# Patient Record
Sex: FEMALE | Race: WHITE | Employment: FULL TIME | URBAN - METROPOLITAN AREA
[De-identification: names, ages, dates, MRNs, and addresses within clinical notes are randomized per-mention and may not be internally consistent; named-entity substitution may affect disease eponyms.]

---

## 2018-04-27 DIAGNOSIS — Z86.2 PERSONAL HISTORY OF DISEASES OF BLOOD AND BLOOD-FORMING ORGANS: ICD-10-CM

## 2018-04-27 DIAGNOSIS — Z52.001 STEM CELL DONOR: ICD-10-CM

## 2018-04-30 ENCOUNTER — RESULTS ONLY (OUTPATIENT)
Dept: OTHER | Facility: CLINIC | Age: 58
End: 2018-04-30

## 2018-04-30 ENCOUNTER — OFFICE VISIT (OUTPATIENT)
Dept: TRANSPLANT | Facility: CLINIC | Age: 58
End: 2018-04-30
Attending: INTERNAL MEDICINE

## 2018-04-30 ENCOUNTER — OFFICE VISIT (OUTPATIENT)
Dept: TRANSPLANT | Facility: CLINIC | Age: 58
End: 2018-04-30
Attending: NURSE PRACTITIONER

## 2018-04-30 ENCOUNTER — RADIANT APPOINTMENT (OUTPATIENT)
Dept: GENERAL RADIOLOGY | Facility: CLINIC | Age: 58
End: 2018-04-30
Attending: INTERNAL MEDICINE

## 2018-04-30 VITALS
OXYGEN SATURATION: 98 % | RESPIRATION RATE: 16 BRPM | HEIGHT: 63 IN | HEART RATE: 88 BPM | WEIGHT: 184.1 LBS | SYSTOLIC BLOOD PRESSURE: 139 MMHG | TEMPERATURE: 97.8 F | BODY MASS INDEX: 32.62 KG/M2 | DIASTOLIC BLOOD PRESSURE: 86 MMHG

## 2018-04-30 VITALS
RESPIRATION RATE: 16 BRPM | SYSTOLIC BLOOD PRESSURE: 139 MMHG | BODY MASS INDEX: 32.62 KG/M2 | WEIGHT: 184.1 LBS | DIASTOLIC BLOOD PRESSURE: 86 MMHG | OXYGEN SATURATION: 98 % | HEART RATE: 88 BPM | HEIGHT: 63 IN | TEMPERATURE: 97.8 F

## 2018-04-30 DIAGNOSIS — Z86.2 PERSONAL HISTORY OF DISEASES OF BLOOD AND BLOOD-FORMING ORGANS: ICD-10-CM

## 2018-04-30 DIAGNOSIS — Z52.001 STEM CELL DONOR: ICD-10-CM

## 2018-04-30 DIAGNOSIS — Z52.3 BONE MARROW DONOR: Primary | ICD-10-CM

## 2018-04-30 LAB
ABO + RH BLD: NORMAL
ABO + RH BLD: NORMAL
ALBUMIN SERPL-MCNC: 4 G/DL (ref 3.4–5)
ALBUMIN UR-MCNC: NEGATIVE MG/DL
ALP SERPL-CCNC: 88 U/L (ref 40–150)
ALT SERPL W P-5'-P-CCNC: 24 U/L (ref 0–50)
ANION GAP SERPL CALCULATED.3IONS-SCNC: 9 MMOL/L (ref 3–14)
APPEARANCE UR: CLEAR
APTT PPP: 27 SEC (ref 22–37)
AST SERPL W P-5'-P-CCNC: 14 U/L (ref 0–45)
BASOPHILS # BLD AUTO: 0.1 10E9/L (ref 0–0.2)
BASOPHILS NFR BLD AUTO: 0.9 %
BILIRUB SERPL-MCNC: 0.3 MG/DL (ref 0.2–1.3)
BILIRUB UR QL STRIP: NEGATIVE
BLD GP AB SCN SERPL QL: NORMAL
BLOOD BANK CMNT PATIENT-IMP: NORMAL
BUN SERPL-MCNC: 14 MG/DL (ref 7–30)
CALCIUM SERPL-MCNC: 9.3 MG/DL (ref 8.5–10.1)
CHLORIDE SERPL-SCNC: 109 MMOL/L (ref 94–109)
CMV IGG SERPL QL IA: 7.6 AI (ref 0–0.8)
CO2 SERPL-SCNC: 23 MMOL/L (ref 20–32)
COLOR UR AUTO: YELLOW
CREAT SERPL-MCNC: 0.66 MG/DL (ref 0.52–1.04)
DIFFERENTIAL METHOD BLD: NORMAL
EBV VCA IGG SER QL IA: 6.1 AI (ref 0–0.8)
EOSINOPHIL # BLD AUTO: 0.3 10E9/L (ref 0–0.7)
EOSINOPHIL NFR BLD AUTO: 3.3 %
ERYTHROCYTE [DISTWIDTH] IN BLOOD BY AUTOMATED COUNT: 13.1 % (ref 10–15)
GFR SERPL CREATININE-BSD FRML MDRD: >90 ML/MIN/1.7M2
GLUCOSE SERPL-MCNC: 86 MG/DL (ref 70–99)
GLUCOSE UR STRIP-MCNC: NEGATIVE MG/DL
HCG SERPL QL: NEGATIVE
HCT VFR BLD AUTO: 45.1 % (ref 35–47)
HGB BLD-MCNC: 15.2 G/DL (ref 11.7–15.7)
HGB UR QL STRIP: NEGATIVE
HSV1 IGG SERPL QL IA: >8 AI (ref 0–0.8)
HSV2 IGG SERPL QL IA: <0.2 AI (ref 0–0.8)
IMM GRANULOCYTES # BLD: 0 10E9/L (ref 0–0.4)
IMM GRANULOCYTES NFR BLD: 0.3 %
INR PPP: 0.95 (ref 0.86–1.14)
KETONES UR STRIP-MCNC: NEGATIVE MG/DL
LEUKOCYTE ESTERASE UR QL STRIP: NEGATIVE
LYMPHOCYTES # BLD AUTO: 2.8 10E9/L (ref 0.8–5.3)
LYMPHOCYTES NFR BLD AUTO: 34.6 %
MCH RBC QN AUTO: 30.6 PG (ref 26.5–33)
MCHC RBC AUTO-ENTMCNC: 33.7 G/DL (ref 31.5–36.5)
MCV RBC AUTO: 91 FL (ref 78–100)
MONOCYTES # BLD AUTO: 0.6 10E9/L (ref 0–1.3)
MONOCYTES NFR BLD AUTO: 7.9 %
MUCOUS THREADS #/AREA URNS LPF: PRESENT /LPF
NEUTROPHILS # BLD AUTO: 4.2 10E9/L (ref 1.6–8.3)
NEUTROPHILS NFR BLD AUTO: 53 %
NITRATE UR QL: NEGATIVE
NRBC # BLD AUTO: 0 10*3/UL
NRBC BLD AUTO-RTO: 0 /100
PH UR STRIP: 6 PH (ref 5–7)
PLATELET # BLD AUTO: 234 10E9/L (ref 150–450)
POTASSIUM SERPL-SCNC: 4 MMOL/L (ref 3.4–5.3)
PROT SERPL-MCNC: 7.6 G/DL (ref 6.8–8.8)
RBC # BLD AUTO: 4.97 10E12/L (ref 3.8–5.2)
RBC #/AREA URNS AUTO: 0 /HPF (ref 0–2)
SODIUM SERPL-SCNC: 142 MMOL/L (ref 133–144)
SOURCE: ABNORMAL
SP GR UR STRIP: 1.01 (ref 1–1.03)
SPECIMEN EXP DATE BLD: NORMAL
SQUAMOUS #/AREA URNS AUTO: <1 /HPF (ref 0–1)
UROBILINOGEN UR STRIP-MCNC: 0 MG/DL (ref 0–2)
WBC # BLD AUTO: 8 10E9/L (ref 4–11)
WBC #/AREA URNS AUTO: 0 /HPF (ref 0–5)

## 2018-04-30 PROCEDURE — 86644 CMV ANTIBODY: CPT | Performed by: INTERNAL MEDICINE

## 2018-04-30 PROCEDURE — 86687 HTLV-I ANTIBODY: CPT | Performed by: INTERNAL MEDICINE

## 2018-04-30 PROCEDURE — 87535 HIV-1 PROBE&REVERSE TRNSCRPJ: CPT | Performed by: INTERNAL MEDICINE

## 2018-04-30 PROCEDURE — 86703 HIV-1/HIV-2 1 RESULT ANTBDY: CPT | Performed by: INTERNAL MEDICINE

## 2018-04-30 PROCEDURE — 86665 EPSTEIN-BARR CAPSID VCA: CPT | Performed by: INTERNAL MEDICINE

## 2018-04-30 PROCEDURE — 86753 PROTOZOA ANTIBODY NOS: CPT | Performed by: INTERNAL MEDICINE

## 2018-04-30 PROCEDURE — 86901 BLOOD TYPING SEROLOGIC RH(D): CPT | Performed by: INTERNAL MEDICINE

## 2018-04-30 PROCEDURE — 80053 COMPREHEN METABOLIC PANEL: CPT | Performed by: INTERNAL MEDICINE

## 2018-04-30 PROCEDURE — 85610 PROTHROMBIN TIME: CPT | Performed by: INTERNAL MEDICINE

## 2018-04-30 PROCEDURE — 85730 THROMBOPLASTIN TIME PARTIAL: CPT | Performed by: INTERNAL MEDICINE

## 2018-04-30 PROCEDURE — 84703 CHORIONIC GONADOTROPIN ASSAY: CPT | Performed by: INTERNAL MEDICINE

## 2018-04-30 PROCEDURE — 40000268 ZZH STATISTIC NO CHARGES: Mod: ZF

## 2018-04-30 PROCEDURE — 86695 HERPES SIMPLEX TYPE 1 TEST: CPT | Performed by: INTERNAL MEDICINE

## 2018-04-30 PROCEDURE — 85025 COMPLETE CBC W/AUTO DIFF WBC: CPT | Performed by: INTERNAL MEDICINE

## 2018-04-30 PROCEDURE — 86900 BLOOD TYPING SEROLOGIC ABO: CPT | Performed by: INTERNAL MEDICINE

## 2018-04-30 PROCEDURE — 87516 HEPATITIS B DNA AMP PROBE: CPT | Performed by: INTERNAL MEDICINE

## 2018-04-30 PROCEDURE — 40000803 ZZHCL STATISTIC DNA ISOL HIGH PURITY: Performed by: INTERNAL MEDICINE

## 2018-04-30 PROCEDURE — 86696 HERPES SIMPLEX TYPE 2 TEST: CPT | Performed by: INTERNAL MEDICINE

## 2018-04-30 PROCEDURE — 87521 HEPATITIS C PROBE&RVRS TRNSC: CPT | Performed by: INTERNAL MEDICINE

## 2018-04-30 PROCEDURE — 86803 HEPATITIS C AB TEST: CPT | Performed by: INTERNAL MEDICINE

## 2018-04-30 PROCEDURE — 93010 ELECTROCARDIOGRAM REPORT: CPT | Mod: ZP | Performed by: INTERNAL MEDICINE

## 2018-04-30 PROCEDURE — 86850 RBC ANTIBODY SCREEN: CPT | Performed by: INTERNAL MEDICINE

## 2018-04-30 PROCEDURE — 81001 URINALYSIS AUTO W/SCOPE: CPT | Performed by: INTERNAL MEDICINE

## 2018-04-30 PROCEDURE — 87340 HEPATITIS B SURFACE AG IA: CPT | Performed by: INTERNAL MEDICINE

## 2018-04-30 PROCEDURE — 86780 TREPONEMA PALLIDUM: CPT | Performed by: INTERNAL MEDICINE

## 2018-04-30 PROCEDURE — 86704 HEP B CORE ANTIBODY TOTAL: CPT | Performed by: INTERNAL MEDICINE

## 2018-04-30 PROCEDURE — 87798 DETECT AGENT NOS DNA AMP: CPT | Performed by: INTERNAL MEDICINE

## 2018-04-30 RX ORDER — NAPROXEN SODIUM 550 MG/1
550 TABLET ORAL PRN
Status: ON HOLD | COMMUNITY
End: 2018-06-28

## 2018-04-30 RX ORDER — SUMATRIPTAN 100 MG/1
100 TABLET, FILM COATED ORAL PRN
COMMUNITY

## 2018-04-30 RX ORDER — IBUPROFEN 800 MG/1
800 TABLET, FILM COATED ORAL PRN
Status: ON HOLD | COMMUNITY
End: 2018-06-28

## 2018-04-30 RX ORDER — CYCLOBENZAPRINE HCL 10 MG
10 TABLET ORAL PRN
COMMUNITY
Start: 2017-10-17

## 2018-04-30 RX ORDER — MULTIPLE VITAMINS W/ MINERALS TAB 9MG-400MCG
1 TAB ORAL DAILY
COMMUNITY

## 2018-04-30 ASSESSMENT — PAIN SCALES - GENERAL
PAINLEVEL: NO PAIN (0)
PAINLEVEL: NO PAIN (0)

## 2018-04-30 NOTE — MR AVS SNAPSHOT
"              After Visit Summary   4/30/2018    Xiao Rebollar    MRN: [id number]           Patient Information     Date Of Birth          1960        Visit Information        Provider Department      4/30/2018 1:30 PM  BMT RODRÍGUEZ #3 [facility] Blood and Marrow Transplant        Today's Diagnoses     Bone marrow donor    -  1          [facility] ([facility])  [address] [place] [address]  Phone: [phone]  Clinic Hours:   Monday-Thursday:7am to 7pm   Friday: 7am to 5pm   Weekends and holidays:    8am to noon (in general)  If your fever is 100.5  or greater,   call the clinic.  After hours call the   hospital at [phone] or   [phone]. Ask for the BMT   fellow on-call            Follow-ups after your visit        Who to contact     If you have questions or need follow up information about today's clinic visit or your schedule please contact [facility] BLOOD AND MARROW TRANSPLANT directly at [phone].  Normal or non-critical lab and imaging results will be communicated to you by [employer] Systemshart, letter or phone within 4 business days after the clinic has received the results. If you do not hear from us within 7 days, please contact the clinic through Pir[employer] or phone. If you have a critical or abnormal lab result, we will notify you by phone as soon as possible.  Submit refill requests through [employer] or call your pharmacy and they will forward the refill request to us. Please allow 3 business days for your refill to be completed.          Additional Information About Your Visit        [employer] Information     [employer] lets you send messages to your doctor, view your test results, renew your prescriptions, schedule appointments and more. To sign up, go to [email or web address] . Click on \"Log in\" on the left side of the screen, which will take you to the Welcome page. Then click on \"Sign up Now\" on the right side of the page.     You will be asked to enter the access code listed " "below, as well as some personal information. Please follow the directions to create your username and password.     Your access code is: [email or web address]  Expires: 2018  6:30 AM     Your access code will  in 90 days. If you need help or a new code, please call your [place] clinic or [phone].        Care EveryWhere ID     This is your Care EveryWhere ID. This could be used by other organizations to access your [place] medical records  [id number]        Your Vitals Were     Pulse Temperature Respirations Height Pulse Oximetry BMI (Body Mass Index)    88 97.8  F (36.6  C) (Oral) 16 1.595 m (5' 2.8\") 98% 32.83 kg/m2       Blood Pressure from Last 3 Encounters:   18 139/86   18 139/86    Weight from Last 3 Encounters:   18 83.5 kg (184 lb 1.6 oz)   18 83.5 kg (184 lb 1.6 oz)              Today, you had the following     No orders found for display       Recent Review Flowsheet Data     BMT Recent Results Latest Ref Rng & Units 2018    WBC 4.0 - 11.0 10e9/L 8.0    Hemoglobin 11.7 - 15.7 g/dL 15.2    Platelet Count 150 - 450 10e9/L 234    Neutrophils (Absolute) 1.6 - 8.3 10e9/L 4.2    INR 0.86 - 1.14 0.95    Sodium 133 - 144 mmol/L 142    Potassium 3.4 - 5.3 mmol/L 4.0    Chloride 94 - 109 mmol/L 109    Glucose 70 - 99 mg/dL 86    Urea Nitrogen 7 - 30 mg/dL 14    Creatinine 0.52 - 1.04 mg/dL 0.66    Calcium (Total) 8.5 - 10.1 mg/dL 9.3    Protein (Total) 6.8 - 8.8 g/dL 7.6    Albumin 3.4 - 5.0 g/dL 4.0    Alkaline Phosphatase 40 - 150 U/L 88    AST 0 - 45 U/L 14    ALT 0 - 50 U/L 24    MCV 78 - 100 fl 91               Primary Care Provider Fax #    Physician No Ref-Primary [phone]       No address on file        Equal Access to Services     [facility]: Natalia Lange, dallas arroyo, joya yu, linda ortiz. [facility] [phone].    ATENCIÓN: Si habla español, tiene a gregory disposición servicios gratuitos de " asistencia lingüística. Sandra al [phone].    We comply with applicable federal civil rights laws and [place] laws. We do not discriminate on the basis of race, color, national origin, age, disability, sex, sexual orientation, or gender identity.            Thank you!     Thank you for choosing [facility] BLOOD AND MARROW TRANSPLANT  for your care. Our goal is always to provide you with excellent care. Hearing back from our patients is one way we can continue to improve our services. Please take a few minutes to complete the written survey that you may receive in the mail after your visit with us. Thank you!             Your Updated Medication List - Protect others around you: Learn how to safely use, store and throw away your medicines at www.disposemymeds.org.          This list is accurate as of 4/30/18  3:35 PM.  Always use your most recent med list.                   Brand Name Dispense Instructions for use Diagnosis    cyclobenzaprine 10 MG tablet    FLEXERIL     Take 10 mg by mouth as needed        ibuprofen 800 MG tablet    ADVIL/MOTRIN     Take 800 mg by mouth as needed        IMITREX 100 MG tablet   Generic drug:  SUMAtriptan      Take 100 mg by mouth as needed        magnesium sulfate 500 mg/mL Soln      Take by mouth as needed        Multi-vitamin Tabs tablet      Take 1 tablet by mouth daily        naproxen sodium 550 MG tablet    ANAPROX     Take 550 mg by mouth as needed

## 2018-04-30 NOTE — NURSING NOTE
Attempted to complete EKG, unable to perform, pt has spinal stimulator. Amy and Jenanie Vila (nurse coordinator) notified.  Carline Gar MA

## 2018-04-30 NOTE — MR AVS SNAPSHOT
"              After Visit Summary   4/30/2018    Xiao Rebollar    MRN: [id number]           Patient Information     Date Of Birth          1960        Visit Information        Provider Department      4/30/2018 1:00 PM Coordinator, James Bmt Nurse;  2 116 CONSULT RM [facility] Blood and Marrow Transplant        Today's Diagnoses     Bone marrow donor    -  1          Cl[facility]ics and Surgery Center ([facility])  [address] [place] [address]  Phone: [phone]  Clinic Hours:   Monday-Thursday:7am to 7pm   Friday: 7am to 5pm   Weekends and holidays:    8am to noon (in general)  If your fever is 100.5  or greater,   call the clinic.  After hours call the   hospital at [phone] or   [phone]. Ask for the BMT   fellow on-call            Follow-ups after your visit        Who to contact     If you have questions or need follow up information about today's clinic visit or your schedule please contact [facility] BLOOD AND MARROW TRANSPLANT directly at [phone].  Normal or non-critical lab and imaging results will be communicated to you by Rally[employer], letter or phone within 4 business days after the clinic has received the results. If you do not hear from us within 7 days, please contact the clinic through [employer] or phone. If you have a critical or abnormal lab result, we will notify you by phone as soon as possible.  Submit refill requests through [email or web address] or call your pharmacy and they will forward the refill request to us. Please allow 3 business days for your refill to be completed.          Additional Information About Your Visit        [employer] Information     [email or web address] lets you send messages to your doctor, view your test results, renew your prescriptions, schedule appointments and more. To sign up, go to [email or web address] . Click on \"Log in\" on the left side of the screen, which will take you to the Welcome page. Then click on \"Sign up Now\" on the right side of the page.     You will be asked " to enter the access code listed below, as well as some personal information. Please follow the directions to create your username and password.     Your access code is: [email or web address]  Expires: 2018  6:30 AM     Your access code will  in 90 days. If you need help or a new code, please call your [place] clinic or [phone].        Care EveryWhere ID     This is your Care EveryWhere ID. This could be used by other organizations to access your [place] medical records  [id number]         Blood Pressure from Last 3 Encounters:   18 139/86   18 139/86    Weight from Last 3 Encounters:   18 83.5 kg (184 lb 1.6 oz)   18 83.5 kg (184 lb 1.6 oz)              Today, you had the following     No orders found for display       Recent Review Flowsheet Data     BMT Recent Results Latest Ref Rng & Units 2018    WBC 4.0 - 11.0 10e9/L 8.0    Hemoglobin 11.7 - 15.7 g/dL 15.2    Platelet Count 150 - 450 10e9/L 234    Neutrophils (Absolute) 1.6 - 8.3 10e9/L 4.2    INR 0.86 - 1.14 0.95    Sodium 133 - 144 mmol/L 142    Potassium 3.4 - 5.3 mmol/L 4.0    Chloride 94 - 109 mmol/L 109    Glucose 70 - 99 mg/dL 86    Urea Nitrogen 7 - 30 mg/dL 14    Creatinine 0.52 - 1.04 mg/dL 0.66    Calcium (Total) 8.5 - 10.1 mg/dL 9.3    Protein (Total) 6.8 - 8.8 g/dL 7.6    Albumin 3.4 - 5.0 g/dL 4.0    Alkaline Phosphatase 40 - 150 U/L 88    AST 0 - 45 U/L 14    ALT 0 - 50 U/L 24    MCV 78 - 100 fl 91               Primary Care Provider Fax #    Physician No Ref-Primary [phone]       No address on file        Equal Access to Services     [employer] [facility] : Natalia Lange, dallas arroyo, linda jackson. So [facility] [phone].    ATENCIÓN: Si habla español, tiene a gregory disposición servicios gratuitos de asistencia lingüística. Llame al [phone].    We comply with applicable federal civil rights laws and [place] laws. We do not  discriminate on the basis of race, color, national origin, age, disability, sex, sexual orientation, or gender identity.            Thank you!     Thank you for choosing [facility] BLOOD AND MARROW TRANSPLANT  for your care. Our goal is always to provide you with excellent care. Hearing back from our patients is one way we can continue to improve our services. Please take a few minutes to complete the written survey that you may receive in the mail after your visit with us. Thank you!             Your Updated Medication List - Protect others around you: Learn how to safely use, store and throw away your medicines at www.disposemymeds.org.          This list is accurate as of 4/30/18 11:59 PM.  Always use your most recent med list.                   Brand Name Dispense Instructions for use Diagnosis    cyclobenzaprine 10 MG tablet    FLEXERIL     Take 10 mg by mouth as needed        ibuprofen 800 MG tablet    ADVIL/MOTRIN     Take 800 mg by mouth as needed        IMITREX 100 MG tablet   Generic drug:  SUMAtriptan      Take 100 mg by mouth as needed        magnesium sulfate 500 mg/mL Soln      Take by mouth as needed        Multi-vitamin Tabs tablet      Take 1 tablet by mouth daily        naproxen sodium 550 MG tablet    ANAPROX     Take 550 mg by mouth as needed

## 2018-04-30 NOTE — MR AVS SNAPSHOT
After Visit Summary   4/30/2018    Xiao Rebollar    MRN: [id number]           Patient Information     Date Of Birth          1960        Visit Information        Provider Department      4/30/2018 11:00 AM 1,  Bmt Nurse [facility] Blood and Marrow Transplant        Today's Diagnoses     Stem cell donor        Personal history of diseases of blood and blood-forming organs              [facility] [place] ([facility]  [address] [place] [address]  Phone: [phone]  Clinic Hours:   Monday-Thursday:7am to 7pm   Friday: 7am to 5pm   Weekends and holidays:    8am to noon (in general)  If your fever is 100.5  or greater,   call the clinic.  After hours call the   hospital at [phone] or   [phone]. Ask for the BMT   fellow on-call            Follow-ups after your visit        Your next 10 appointments already scheduled     Apr 30, 2018  1:00 PM CDT   BMT NURSE COORD WITH ROOM with  Bmt Nurse Coordinator,  2 116 CONSULT RM   [facility] Blood and Marrow Transplant ([facility])    [address] [place] [address] [phone]            Apr 30, 2018  1:30 PM CDT   Related Donor Visit with  BMT RODRÍGUEZ #3   [facility] Blood and Marrow Transplant ([facility])    [address] [place] [address] [phone]              Who to contact     If you have questions or need follow up information about today's clinic visit or your schedule please contact [facility] BLOOD AND MARROW TRANSPLANT directly at [phone].  Normal or non-critical lab and imaging results will be communicated to you by MyChart, letter or phone within 4 business days after the clinic has received the results. If you do not hear from us within 7 days, please contact the clinic through MyChart or phone. If you have a critical or abnormal lab result, we will notify you by phone as soon as possible.  Submit  "refill requests through [employer] or call your pharmacy and they will forward the refill request to us. Please allow 3 business days for your refill to be completed.          Additional Information About Your Visit        [email or web address] Information     AutoAlert lets you send messages to your doctor, view your test results, renew your prescriptions, schedule appointments and more. To sign up, go to [email or web address] . Click on \"Log in\" on the left side of the screen, which will take you to the Welcome page. Then click on \"Sign up Now\" on the right side of the page.     You will be asked to enter the access code listed below, as well as some personal information. Please follow the directions to create your username and password.     Your access code is: [email or web address]  Expires: 2018  6:30 AM     Your access code will  in 90 days. If you need help or a new code, please call your [place] clinic or [phone].        Care EveryWhere ID     This is your Care EveryWhere ID. This could be used by other organizations to access your [place] medical records  [id number]        Your Vitals Were     Pulse Temperature Respirations Height Pulse Oximetry BMI (Body Mass Index)    88 97.8  F (36.6  C) (Oral) 16 1.595 m (5' 2.8\") 98% 32.83 kg/m2       Blood Pressure from Last 3 Encounters:   18 139/86    Weight from Last 3 Encounters:   18 83.5 kg (184 lb 1.6 oz)              We Performed the Following     ABO/Rh type and screen     BMT Infectious Disease Donor Panel- SEND TO [facility]     CBC with platelets differential     CMV Antibody IgG     Comprehensive metabolic panel     EBV Capsid Antibody IgG - [facility]     HBV HCV HIV WNV by SRINI - SEND TO [facility]     Herpes Simplex Virus 1 and 2 IgG -  Send to [facility]     HLA ABDR/DQ High Resolution BMT Donor     INR     Order if female with child bearing potential: HCG qualitative     Partial thromboplastin time     Pre " BMT polymorphism determination donor     Routine UA with microscopic        Recent Review Flowsheet Data     BMT Recent Results Latest Ref Rng & Units 4/30/2018    WBC 4.0 - 11.0 10e9/L 8.0    Hemoglobin 11.7 - 15.7 g/dL 15.2    Platelet Count 150 - 450 10e9/L 234    Neutrophils (Absolute) 1.6 - 8.3 10e9/L 4.2    INR 0.86 - 1.14 0.95    Sodium 133 - 144 mmol/L 142    Potassium 3.4 - 5.3 mmol/L 4.0    Chloride 94 - 109 mmol/L 109    Glucose 70 - 99 mg/dL 86    Urea Nitrogen 7 - 30 mg/dL 14    Creatinine 0.52 - 1.04 mg/dL 0.66    Calcium (Total) 8.5 - 10.1 mg/dL 9.3    Protein (Total) 6.8 - 8.8 g/dL 7.6    Albumin 3.4 - 5.0 g/dL 4.0    Alkaline Phosphatase 40 - 150 U/L 88    AST 0 - 45 U/L 14    ALT 0 - 50 U/L 24    MCV 78 - 100 fl 91               Primary Care Provider Fax #    Physician No Ref-Primary [phone]       No address on file        Equal Access to Services     LINUS DREW : Hadii johnnie Lange, dallas arroyo, joya yu, linda stanley . So [facility] [phone].    ATENCIÓN: Si habla español, tiene a gregory disposición servicios gratuitos de asistencia lingüística. ame al [phone].    We comply with applicable federal civil rights laws and [place] laws. We do not discriminate on the basis of race, color, national origin, age, disability, sex, sexual orientation, or gender identity.            Thank you!     Thank you for choosing [facility] BLOOD AND MARROW TRANSPLANT  for your care. Our goal is always to provide you with excellent care. Hearing back from our patients is one way we can continue to improve our services. Please take a few minutes to complete the written survey that you may receive in the mail after your visit with us. Thank you!             Your Updated Medication List - Protect others around you: Learn how to safely use, store and throw away your medicines at www.Drug123.comemymeds.org.          This list is accurate as of 4/30/18 12:52  PM.  Always use your most recent med list.                   Brand Name Dispense Instructions for use Diagnosis    cyclobenzaprine 10 MG tablet    FLEXERIL     Take 10 mg by mouth as needed        ibuprofen 800 MG tablet    ADVIL/MOTRIN     Take 800 mg by mouth as needed        IMITREX 100 MG tablet   Generic drug:  SUMAtriptan      Take 100 mg by mouth as needed        magnesium sulfate 500 mg/mL Soln      Take by mouth as needed        Multi-vitamin Tabs tablet      Take 1 tablet by mouth daily        naproxen sodium 550 MG tablet    ANAPROX     Take 550 mg by mouth as needed

## 2018-04-30 NOTE — PROGRESS NOTES
BMT Donor History and Physical    Xiao Rebollar MRN# [id number]   Age: 58 year old YOB: 1960            Assessment and Plan   Other: Pending ID labs, EKG, CXR, pt is appropriate donor  -Pt will get EKG done in [place], [place] and fax to [facility] [address]     HPI: Healthy donor  Transfusions: Yes, dates: , reactions: none  Hepatitis: No  Currently pregnant or any chance may be pregnant: No  Currently breast feeding: No  Currently using a method of birth control: Yes  Menopause  Number of previous pregnancies: 4  Alcohol use: No  Tobacco use: No  Recreational drugs: No  Nonmedical percutaneous drug use: No  Recent immunizations or planning on getting any immunizations: No  Ear or body piercing in the last 12 months: No  New tattoo in recent 12 months:No  History of cancer or blood disease: No  Known risk factors: none         Past Medical History:   Migraine headaches         Past Surgical History:   DNC  4 cesarian sections  Total hip (R)  Total hysterectomy  Spinal stimulator         Social History:     Social History   Substance Use Topics     Smoking status: Never Smoker     Smokeless tobacco: Never Used     Alcohol use Yes      Comment: Couple in 1 year       40years, 4 children; live in [place], [place]. Owner of [employer];         Family History:   Father  of chirrosis (64)           Immunizations:   Immunizations are up to date         Allergies:   No Known Allergies         Medications:     Current Outpatient Prescriptions   Medication Sig     cyclobenzaprine (FLEXERIL) 10 MG tablet Take 10 mg by mouth as needed     ibuprofen (ADVIL/MOTRIN) 800 MG tablet Take 800 mg by mouth as needed     magnesium sulfate 500 mg/mL SOLN Take by mouth as needed     multivitamin, therapeutic with minerals (MULTI-VITAMIN) TABS tablet Take 1 tablet by mouth daily     naproxen sodium (ANAPROX) 550 MG tablet Take 550 mg by mouth as needed     SUMAtriptan (IMITREX) 100 MG tablet Take 100 mg by mouth  as needed     No current facility-administered medications for this visit.             Review of Systems:   CONSTITUTIONAL: NEGATIVE for fever, chills, change in weight  ENT/MOUTH: NEGATIVE for ear, mouth and throat problems  RESP: NEGATIVE for significant cough or SOB  CV: NEGATIVE for chest pain, palpitations or peripheral edema  GI: NEGATIVE for nausea, abdominal pain, heartburn, or change in bowel habits  MUSCULOSKELETAL: NEGATIVE for significant arthralgias or myalgia and back pain  NEURO: NEGATIVE for weakness, dizziness or paresthesias  ROS otherwise negative         Physical Exam:   Vitals were reviewed  Temp: 97.8  F (36.6  C) Temp src: Oral BP: 139/86 Pulse: 88   Resp: 16 SpO2: 98 %      Constitutional:   awake, alert, cooperative, no apparent distress, and appears stated age   Eyes:   sclera clear and conjunctiva normal   ENT:   Normocephalic, without obvious abnormality, atraumatic, sinuses nontender on palpation, external ears without lesions, oral pharynx with moist mucous membranes, tonsils without erythema or exudates, gums normal and good dentition.         Lungs:   No increased work of breathing, good air exchange, clear to auscultation bilaterally, no crackles or wheezing   Cardiovascular:   normal S1 and S2   Abdomen:   normal bowel sounds         Musculoskeletal:   no lower extremity pitting edema present       Skin:   no rashes            Data:     Lab Results   Component Value Date    WBC 8.0 04/30/2018    ANEU 4.2 04/30/2018    HGB 15.2 04/30/2018    HCT 45.1 04/30/2018     04/30/2018     04/30/2018    POTASSIUM 4.0 04/30/2018    CHLORIDE 109 04/30/2018    CO2 23 04/30/2018    GLC 86 04/30/2018    BUN 14 04/30/2018    CR 0.66 04/30/2018    INR 0.95 04/30/2018         Cardiac studies        :EKG pending. Pt has implanted spinal device which needs to be turned off for EKG.   Chest x-ray:   Normal- no infiltrates, effusions, pneumothoraces, cardiomegaly or masses  awaiting formal  interpretation from Radiologist at this time          JEREL Thompson CNP

## 2018-04-30 NOTE — MR AVS SNAPSHOT
"              After Visit Summary   4/30/2018    Xiao Rebollar    MRN: [id number]           Patient Information     Date Of Birth          1960        Visit Information        Provider Department      4/30/2018 12:00 PM 1, James Bmt Nurse [facility] Blood and Marrow Transplant        Today's Diagnoses     Stem cell donor        Personal history of diseases of blood and blood-forming organs              [facility] ([facility] [place] [place])  [address] [place] [address]  Phone: [phone]  Clinic Hours:   Monday-Thursday:7am to 7pm   Friday: 7am to 5pm   Weekends and holidays:    8am to noon (in general)  If your fever is 100.5  or greater,   call the clinic.  After hours call the   hospital at [phone] or   [phone]. Ask for the BMT   fellow on-call            Follow-ups after your visit        Who to contact     If you have questions or need follow up information about today's clinic visit or your schedule please contact [facility] BLOOD AND MARROW TRANSPLANT directly at [phone].  Normal or non-critical lab and imaging results will be communicated to you by [employer], letter or phone within 4 business days after the clinic has received the results. If you do not hear from us within 7 days, please contact the clinic through [employer] or phone. If you have a critical or abnormal lab result, we will notify you by phone as soon as possible.  Submit refill requests through [employer] or call your pharmacy and they will forward the refill request to us. Please allow 3 business days for your refill to be completed.          Additional Information About Your Visit        MyChart Information     [employer] lets you send messages to your doctor, view your test results, renew your prescriptions, schedule appointments and more. To sign up, go to [email or web address] . Click on \"Log in\" on the left side of the screen, which will take you to the Welcome page. Then click on \"Sign up Now\" on the right side " of the page.     You will be asked to enter the access code listed below, as well as some personal information. Please follow the directions to create your username and password.     Your access code is: [email or web address]  Expires: 2018  6:30 AM     Your access code will  in 90 days. If you need help or a new code, please call your [place] clinic or [phone].        Care EveryWhere ID     This is your Care EveryWhere ID. This could be used by other organizations to access your [place] medical records  [id number]         Blood Pressure from Last 3 Encounters:   18 139/86   18 139/86    Weight from Last 3 Encounters:   18 83.5 kg (184 lb 1.6 oz)   18 83.5 kg (184 lb 1.6 oz)              We Performed the Following     EKG 12-lead complete w/read - Clinics        Recent Review Flowsheet Data     BMT Recent Results Latest Ref Rng & Units 2018    WBC 4.0 - 11.0 10e9/L 8.0    Hemoglobin 11.7 - 15.7 g/dL 15.2    Platelet Count 150 - 450 10e9/L 234    Neutrophils (Absolute) 1.6 - 8.3 10e9/L 4.2    INR 0.86 - 1.14 0.95    Sodium 133 - 144 mmol/L 142    Potassium 3.4 - 5.3 mmol/L 4.0    Chloride 94 - 109 mmol/L 109    Glucose 70 - 99 mg/dL 86    Urea Nitrogen 7 - 30 mg/dL 14    Creatinine 0.52 - 1.04 mg/dL 0.66    Calcium (Total) 8.5 - 10.1 mg/dL 9.3    Protein (Total) 6.8 - 8.8 g/dL 7.6    Albumin 3.4 - 5.0 g/dL 4.0    Alkaline Phosphatase 40 - 150 U/L 88    AST 0 - 45 U/L 14    ALT 0 - 50 U/L 24    MCV 78 - 100 fl 91               Primary Care Provider Fax #    Physician No Ref-Primary [phone]       No address on file        Equal Access to Services     LINUS DREW AH: Natalia Lange, dallas arroyo, joya yu, linda stanley ah. [facility] [phone].    ATENCIÓN: Si habla español, tiene a gregory disposición servicios gratuitos de asistencia lingüística. Llame al [phone].    We comply with applicable federal civil rights  laws and [place] laws. We do not discriminate on the basis of race, color, national origin, age, disability, sex, sexual orientation, or gender identity.            Thank you!     Thank you for choosing [facility] BLOOD AND MARROW TRANSPLANT  for your care. Our goal is always to provide you with excellent care. Hearing back from our patients is one way we can continue to improve our services. Please take a few minutes to complete the written survey that you may receive in the mail after your visit with us. Thank you!             Your Updated Medication List - Protect others around you: Learn how to safely use, store and throw away your medicines at www.disposemymeds.org.          This list is accurate as of 4/30/18  2:41 PM.  Always use your most recent med list.                   Brand Name Dispense Instructions for use Diagnosis    cyclobenzaprine 10 MG tablet    FLEXERIL     Take 10 mg by mouth as needed        ibuprofen 800 MG tablet    ADVIL/MOTRIN     Take 800 mg by mouth as needed        IMITREX 100 MG tablet   Generic drug:  SUMAtriptan      Take 100 mg by mouth as needed        magnesium sulfate 500 mg/mL Soln      Take by mouth as needed        Multi-vitamin Tabs tablet      Take 1 tablet by mouth daily        naproxen sodium 550 MG tablet    ANAPROX     Take 550 mg by mouth as needed

## 2018-04-30 NOTE — NURSING NOTE
"Oncology Rooming Note    April 30, 2018 12:44 PM   Xiao Rebollar is a 58 year old female who presents for:    Chief Complaint   Patient presents with     RECHECK     New- RDN H&P      Initial Vitals: /86 (BP Location: Right arm, Patient Position: Sitting, Cuff Size: Adult Regular)  Pulse 88  Temp 97.8  F (36.6  C) (Oral)  Resp 16  Ht 1.595 m (5' 2.8\")  Wt 83.5 kg (184 lb 1.6 oz)  SpO2 98%  BMI 32.83 kg/m2 Estimated body mass index is 32.83 kg/(m^2) as calculated from the following:    Height as of this encounter: 1.595 m (5' 2.8\").    Weight as of this encounter: 83.5 kg (184 lb 1.6 oz). Body surface area is 1.92 meters squared.  No Pain (0) Comment: Data Unavailable   No LMP recorded.  Allergies reviewed: Yes  Medications reviewed: Yes    Medications: Medication refills not needed today.  Pharmacy name entered into EPIC: Data Unavailable    Clinical concerns: N/A     5 minutes for nursing intake (face to face time)     Carline Gar CMA              "

## 2018-04-30 NOTE — NURSING NOTE
"Oncology Rooming Note    April 30, 2018 11:09 AM   Xiao Rebollar is a 58 year old female who presents for:    Chief Complaint   Patient presents with     RECHECK     New: MAYRA consents and calender review      Initial Vitals: /86 (BP Location: Right arm, Patient Position: Sitting, Cuff Size: Adult Regular)  Pulse 88  Temp 97.8  F (36.6  C) (Oral)  Resp 16  Ht 1.595 m (5' 2.8\")  Wt 83.5 kg (184 lb 1.6 oz)  SpO2 98%  BMI 32.83 kg/m2 Estimated body mass index is 32.83 kg/(m^2) as calculated from the following:    Height as of this encounter: 1.595 m (5' 2.8\").    Weight as of this encounter: 83.5 kg (184 lb 1.6 oz). Body surface area is 1.92 meters squared.  No Pain (0) Comment: Data Unavailable   No LMP recorded.  Allergies reviewed: Yes  Medications reviewed: Yes    Medications: Medication refills not needed today.  Pharmacy name entered into EPIC: Data Unavailable    Clinical concerns: N/A     minutes for nursing intake (face to face time)     Carline Gar CMA              "

## 2018-04-30 NOTE — NURSING NOTE
BMT Teaching Flowsheet    Xiao Rebollar is a 58 year old female  Diagnoses of Stem cell donor and Personal history of diseases of blood and blood-forming organs were pertinent to this visit.    Teaching Topic: RDN Teach     Person(s) involved in teaching: Patient  Motivation Level  Asks Questions: Yes  Eager to Learn: Yes  Cooperative: Yes  Receptive (willing/able to accept information): Yes  Any cultural factors/[employer] beliefs that may influence understanding or compliance? No    Teaching concerns addressed: No questions or concerns.      Time spent with patient: 20 minutes.    Specific Concerns: NA

## 2018-05-01 LAB
HLA ABDR/DQ HIGH RESOLUTION BMT DONOR: NORMAL
INTERPRETATION ECG - MUSE: NORMAL

## 2018-05-04 LAB
DONOR CYTOMEGALOVIRUS ABY: POSITIVE
DONOR HEP B CORE ABY: NONREACTIVE
DONOR HEP B SURF AGN: NONREACTIVE
DONOR HEPATITIS C ABY: NONREACTIVE
DONOR HTLV 1&2 ANTIBODY: NONREACTIVE
DONOR TREPONEMA PAL ABY: NONREACTIVE
HIV1+2 AB SERPL QL IA: NONREACTIVE
MPX SERIES: NONREACTIVE
T CRUZI AB SER DONR QL: NONREACTIVE
WNV RNA SPEC QL NAA+PROBE: NONREACTIVE

## 2018-05-07 PROBLEM — Z52.3 BONE MARROW DONOR: Status: ACTIVE | Noted: 2018-05-07

## 2018-05-07 NOTE — PROGRESS NOTES
Met with Xiao who is a donor for her son for a haplo transplant. I reviewed with her consents, calendars, and general information for her bone marrow harvest. Pt was given copies of these. She was also given contact information  And encouraged to call if she has any further questions. teachback method used.

## 2018-05-08 LAB
ASBT COMMENTS: NORMAL
ASBTTEST METHOD: NORMAL
COPATH REPORT: NORMAL
DRSBT COMMENTS: NORMAL
DRSBTTEST METHOD: NORMAL
SBTA* LOCUS: NORMAL
SBTA*: NORMAL
SBTB* LOCUS: NORMAL
SBTC* LOCUS: NORMAL
SBTC*: NORMAL
SBTDQB1*: NORMAL
SBTDQB1*LOCUS: NORMAL
SBTDRB1* LOCUS - FCIS: NORMAL
SBTDRB1*: NORMAL
SBTDRB4*LOCUS NMDP: NORMAL
SBTDRB4*LOCUS: NORMAL

## 2018-06-08 DIAGNOSIS — Z86.2 PERSONAL HISTORY OF DISEASES OF BLOOD AND BLOOD-FORMING ORGANS: ICD-10-CM

## 2018-06-08 DIAGNOSIS — Z52.001 STEM CELL DONOR: ICD-10-CM

## 2018-06-11 ENCOUNTER — OFFICE VISIT (OUTPATIENT)
Dept: TRANSPLANT | Facility: CLINIC | Age: 58
End: 2018-06-11
Attending: INTERNAL MEDICINE

## 2018-06-11 VITALS
OXYGEN SATURATION: 97 % | WEIGHT: 181.44 LBS | HEART RATE: 87 BPM | RESPIRATION RATE: 16 BRPM | DIASTOLIC BLOOD PRESSURE: 84 MMHG | HEIGHT: 63 IN | TEMPERATURE: 97 F | SYSTOLIC BLOOD PRESSURE: 127 MMHG | BODY MASS INDEX: 32.15 KG/M2

## 2018-06-11 VITALS
TEMPERATURE: 98.6 F | HEART RATE: 87 BPM | BODY MASS INDEX: 32.14 KG/M2 | OXYGEN SATURATION: 97 % | HEIGHT: 63 IN | RESPIRATION RATE: 16 BRPM | WEIGHT: 181.4 LBS | SYSTOLIC BLOOD PRESSURE: 127 MMHG | DIASTOLIC BLOOD PRESSURE: 84 MMHG

## 2018-06-11 DIAGNOSIS — Z52.3 BONE MARROW DONOR: Primary | ICD-10-CM

## 2018-06-11 DIAGNOSIS — Z86.2 PERSONAL HISTORY OF DISEASES OF BLOOD AND BLOOD-FORMING ORGANS: ICD-10-CM

## 2018-06-11 DIAGNOSIS — Z52.001 STEM CELL DONOR: ICD-10-CM

## 2018-06-11 DIAGNOSIS — Z52.001 DONOR OF STEM CELL: Primary | ICD-10-CM

## 2018-06-11 LAB
ALBUMIN SERPL-MCNC: 4 G/DL (ref 3.4–5)
ALBUMIN UR-MCNC: NEGATIVE MG/DL
ALP SERPL-CCNC: 94 U/L (ref 40–150)
ALT SERPL W P-5'-P-CCNC: 26 U/L (ref 0–50)
AMORPH CRY #/AREA URNS HPF: ABNORMAL /HPF
ANION GAP SERPL CALCULATED.3IONS-SCNC: 8 MMOL/L (ref 3–14)
APPEARANCE UR: ABNORMAL
APTT PPP: 25 SEC (ref 22–37)
AST SERPL W P-5'-P-CCNC: 16 U/L (ref 0–45)
BASOPHILS # BLD AUTO: 0.1 10E9/L (ref 0–0.2)
BASOPHILS NFR BLD AUTO: 0.6 %
BILIRUB SERPL-MCNC: 0.2 MG/DL (ref 0.2–1.3)
BILIRUB UR QL STRIP: NEGATIVE
BUN SERPL-MCNC: 12 MG/DL (ref 7–30)
CALCIUM SERPL-MCNC: 9.2 MG/DL (ref 8.5–10.1)
CHLORIDE SERPL-SCNC: 105 MMOL/L (ref 94–109)
CO2 SERPL-SCNC: 24 MMOL/L (ref 20–32)
COLOR UR AUTO: YELLOW
CREAT SERPL-MCNC: 0.7 MG/DL (ref 0.52–1.04)
DIFFERENTIAL METHOD BLD: NORMAL
EOSINOPHIL # BLD AUTO: 0.3 10E9/L (ref 0–0.7)
EOSINOPHIL NFR BLD AUTO: 3.5 %
ERYTHROCYTE [DISTWIDTH] IN BLOOD BY AUTOMATED COUNT: 13 % (ref 10–15)
GFR SERPL CREATININE-BSD FRML MDRD: 86 ML/MIN/1.7M2
GLUCOSE SERPL-MCNC: 119 MG/DL (ref 70–99)
GLUCOSE UR STRIP-MCNC: NEGATIVE MG/DL
HCG SERPL QL: NEGATIVE
HCT VFR BLD AUTO: 45.5 % (ref 35–47)
HGB BLD-MCNC: 15.3 G/DL (ref 11.7–15.7)
HGB UR QL STRIP: NEGATIVE
IMM GRANULOCYTES # BLD: 0 10E9/L (ref 0–0.4)
IMM GRANULOCYTES NFR BLD: 0.2 %
INR PPP: 1.01 (ref 0.86–1.14)
KETONES UR STRIP-MCNC: NEGATIVE MG/DL
LEUKOCYTE ESTERASE UR QL STRIP: NEGATIVE
LYMPHOCYTES # BLD AUTO: 2.9 10E9/L (ref 0.8–5.3)
LYMPHOCYTES NFR BLD AUTO: 29.9 %
MCH RBC QN AUTO: 30.4 PG (ref 26.5–33)
MCHC RBC AUTO-ENTMCNC: 33.6 G/DL (ref 31.5–36.5)
MCV RBC AUTO: 90 FL (ref 78–100)
MONOCYTES # BLD AUTO: 0.8 10E9/L (ref 0–1.3)
MONOCYTES NFR BLD AUTO: 8.4 %
MUCOUS THREADS #/AREA URNS LPF: PRESENT /LPF
NEUTROPHILS # BLD AUTO: 5.5 10E9/L (ref 1.6–8.3)
NEUTROPHILS NFR BLD AUTO: 57.4 %
NITRATE UR QL: NEGATIVE
NRBC # BLD AUTO: 0 10*3/UL
NRBC BLD AUTO-RTO: 0 /100
PH UR STRIP: 7 PH (ref 5–7)
PLATELET # BLD AUTO: 252 10E9/L (ref 150–450)
POTASSIUM SERPL-SCNC: 3.9 MMOL/L (ref 3.4–5.3)
PROT SERPL-MCNC: 7.6 G/DL (ref 6.8–8.8)
RBC # BLD AUTO: 5.04 10E12/L (ref 3.8–5.2)
RBC #/AREA URNS AUTO: 1 /HPF (ref 0–2)
SODIUM SERPL-SCNC: 137 MMOL/L (ref 133–144)
SOURCE: ABNORMAL
SP GR UR STRIP: 1.01 (ref 1–1.03)
SQUAMOUS #/AREA URNS AUTO: <1 /HPF (ref 0–1)
UROBILINOGEN UR STRIP-MCNC: 0 MG/DL (ref 0–2)
WBC # BLD AUTO: 9.6 10E9/L (ref 4–11)
WBC #/AREA URNS AUTO: 0 /HPF (ref 0–5)

## 2018-06-11 PROCEDURE — 40000268 ZZH STATISTIC NO CHARGES: Mod: ZF

## 2018-06-11 PROCEDURE — 86803 HEPATITIS C AB TEST: CPT | Performed by: INTERNAL MEDICINE

## 2018-06-11 PROCEDURE — 84703 CHORIONIC GONADOTROPIN ASSAY: CPT | Performed by: INTERNAL MEDICINE

## 2018-06-11 PROCEDURE — 87340 HEPATITIS B SURFACE AG IA: CPT | Performed by: INTERNAL MEDICINE

## 2018-06-11 PROCEDURE — 86665 EPSTEIN-BARR CAPSID VCA: CPT | Performed by: INTERNAL MEDICINE

## 2018-06-11 PROCEDURE — 86753 PROTOZOA ANTIBODY NOS: CPT | Performed by: INTERNAL MEDICINE

## 2018-06-11 PROCEDURE — 87798 DETECT AGENT NOS DNA AMP: CPT | Performed by: INTERNAL MEDICINE

## 2018-06-11 PROCEDURE — 80053 COMPREHEN METABOLIC PANEL: CPT | Performed by: INTERNAL MEDICINE

## 2018-06-11 PROCEDURE — 86900 BLOOD TYPING SEROLOGIC ABO: CPT | Performed by: INTERNAL MEDICINE

## 2018-06-11 PROCEDURE — 86696 HERPES SIMPLEX TYPE 2 TEST: CPT | Performed by: INTERNAL MEDICINE

## 2018-06-11 PROCEDURE — 86780 TREPONEMA PALLIDUM: CPT | Performed by: INTERNAL MEDICINE

## 2018-06-11 PROCEDURE — 87516 HEPATITIS B DNA AMP PROBE: CPT | Performed by: INTERNAL MEDICINE

## 2018-06-11 PROCEDURE — 86644 CMV ANTIBODY: CPT | Performed by: INTERNAL MEDICINE

## 2018-06-11 PROCEDURE — 86695 HERPES SIMPLEX TYPE 1 TEST: CPT | Performed by: INTERNAL MEDICINE

## 2018-06-11 PROCEDURE — 86901 BLOOD TYPING SEROLOGIC RH(D): CPT | Performed by: INTERNAL MEDICINE

## 2018-06-11 PROCEDURE — 85025 COMPLETE CBC W/AUTO DIFF WBC: CPT | Performed by: INTERNAL MEDICINE

## 2018-06-11 PROCEDURE — 83021 HEMOGLOBIN CHROMOTOGRAPHY: CPT | Performed by: INTERNAL MEDICINE

## 2018-06-11 PROCEDURE — 86850 RBC ANTIBODY SCREEN: CPT | Performed by: INTERNAL MEDICINE

## 2018-06-11 PROCEDURE — 87521 HEPATITIS C PROBE&RVRS TRNSC: CPT | Performed by: INTERNAL MEDICINE

## 2018-06-11 PROCEDURE — 86687 HTLV-I ANTIBODY: CPT | Performed by: INTERNAL MEDICINE

## 2018-06-11 PROCEDURE — 87535 HIV-1 PROBE&REVERSE TRNSCRPJ: CPT | Performed by: INTERNAL MEDICINE

## 2018-06-11 PROCEDURE — 86703 HIV-1/HIV-2 1 RESULT ANTBDY: CPT | Performed by: INTERNAL MEDICINE

## 2018-06-11 PROCEDURE — 81001 URINALYSIS AUTO W/SCOPE: CPT | Performed by: INTERNAL MEDICINE

## 2018-06-11 PROCEDURE — 85610 PROTHROMBIN TIME: CPT | Performed by: INTERNAL MEDICINE

## 2018-06-11 PROCEDURE — 86704 HEP B CORE ANTIBODY TOTAL: CPT | Performed by: INTERNAL MEDICINE

## 2018-06-11 PROCEDURE — 85730 THROMBOPLASTIN TIME PARTIAL: CPT | Performed by: INTERNAL MEDICINE

## 2018-06-11 ASSESSMENT — PAIN SCALES - GENERAL
PAINLEVEL: NO PAIN (0)
PAINLEVEL: NO PAIN (0)

## 2018-06-11 NOTE — NURSING NOTE
BMT Teaching Flowsheet    Xiao Rebollar is a 58 year old female  Diagnoses of Stem cell donor and Personal history of diseases of blood and blood-forming organs were pertinent to this visit.    Teaching Topic: RDN Consents and Calender Review     Person(s) involved in teaching: Patient and Spouse  Motivation Level  Asks Questions: Yes  Eager to Learn: Yes  Cooperative: Yes  Receptive (willing/able to accept information): Yes  Any cultural factors/[employer] beliefs that may influence understanding or compliance? No    Patient demonstrates understanding of the following:  - Reason for the appointment, diagnosis and treatment plan: Yes  - Knowledge of proper use of medications and conditions for which they are ordered (with special attention to potential side effects or drug interactions): Yes  - Which situations necessitate calling provider and whom to contact: Yes     Teaching concerns addressed: Reviewed and signed consents with patient, reviewed daily schedule with pt. Pt demonstrated understanding.       Instructional Materials Used/Given:     Time spent with patient: 20 minutes.    Specific Concerns: NA

## 2018-06-11 NOTE — NURSING NOTE
"Oncology Rooming Note    June 11, 2018 12:03 PM   Xiao Rebollar is a 58 year old female who presents for:    Chief Complaint   Patient presents with     RECHECK     RDN Consents and Calender Review- Bone marrow donor      Initial Vitals: /84 (BP Location: Right arm, Patient Position: Sitting, Cuff Size: Adult Regular)  Pulse 87  Temp 98.6  F (37  C) (Oral)  Resp 16  Ht 1.595 m (5' 2.8\")  Wt 82.3 kg (181 lb 6.4 oz)  SpO2 97%  BMI 32.34 kg/m2 Estimated body mass index is 32.34 kg/(m^2) as calculated from the following:    Height as of this encounter: 1.595 m (5' 2.8\").    Weight as of this encounter: 82.3 kg (181 lb 6.4 oz). Body surface area is 1.91 meters squared.  No Pain (0) Comment: Data Unavailable   No LMP recorded.  Allergies reviewed: Yes  Medications reviewed: Yes    Medications: Medication refills not needed today.  Pharmacy name entered into EPIC: Data Unavailable    Clinical concerns: N/A    0 minutes for nursing intake (face to face time)     Carline Gar CMA                "
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
## 2018-06-11 NOTE — MR AVS SNAPSHOT
"              After Visit Summary   6/11/2018    Xiao Rebollar    MRN: [id number]           Patient Information     Date Of Birth          1960        Visit Information        Provider Department      6/11/2018 11:30 AM James Lacey Bmt Nurse [facility] Blood and Marrow Transplant        Today's Diagnoses     Stem cell donor        Personal history of diseases of blood and blood-forming organs              [facility]  [address] [place] [address]  Phone: [phone]  Clinic Hours:   Monday-Thursday:7am to 7pm   Friday: 7am to 5pm   Weekends and holidays:    8am to noon (in general)  If your fever is 100.5  or greater,   call the clinic.  After hours call the   hospital at [phone] or   [phone]. Ask for the BMT   fellow on-call            Follow-ups after your visit        Who to contact     If you have questions or need follow up information about today's clinic visit or your schedule please contact [facility] BLOOD AND MARROW TRANSPLANT directly at [phone].  Normal or non-critical lab and imaging results will be communicated to you by [employer], letter or phone within 4 business days after the clinic has received the results. If you do not hear from us within 7 days, please contact the clinic through [employer] or phone. If you have a critical or abnormal lab result, we will notify you by phone as soon as possible.  Submit refill requests through [employer] or call your pharmacy and they will forward the refill request to us. Please allow 3 business days for your refill to be completed.          Additional Information About Your Visit        MyChart Information     [employer] lets you send messages to your doctor, view your test results, renew your prescriptions, schedule appointments and more. To sign up, go to [email or web address] . Click on \"Log in\" on the left side of the screen, which will take you to the Welcome page. Then click on \"Sign up Now\" on the right side " "of the page.     You will be asked to enter the access code listed below, as well as some personal information. Please follow the directions to create your username and password.     Your access code is: [email or web address]  Expires: 2018  6:30 AM     Your access code will  in 90 days. If you need help or a new code, please call your [place] clinic or [phone].        Care EveryWhere ID     This is your Care EveryWhere ID. This could be used by other organizations to access your [place] medical records  [id number]        Your Vitals Were     Pulse Temperature Respirations Height Pulse Oximetry BMI (Body Mass Index)    87 98.6  F (37  C) (Oral) 16 1.595 m (5' 2.8\") 97% 32.34 kg/m2       Blood Pressure from Last 3 Encounters:   18 127/84   18 127/84   18 139/86    Weight from Last 3 Encounters:   18 82.3 kg (181 lb 7 oz)   18 82.3 kg (181 lb 6.4 oz)   18 83.5 kg (184 lb 1.6 oz)              We Performed the Following     ABO/Rh type and screen     CBC with platelets differential     CMV Antibody IgG     Comprehensive metabolic panel     INR     Order if female with child bearing potential: HCG qualitative     Partial thromboplastin time     UA with Microscopic        Recent Review Flowsheet Data     BMT Recent Results Latest Ref Rng & Units 2018    WBC 4.0 - 11.0 10e9/L 8.0 9.6    Hemoglobin 11.7 - 15.7 g/dL 15.2 15.3    Platelet Count 150 - 450 10e9/L 234 252    Neutrophils (Absolute) 1.6 - 8.3 10e9/L 4.2 5.5    INR 0.86 - 1.14 0.95 1.01    Sodium 133 - 144 mmol/L 142 137    Potassium 3.4 - 5.3 mmol/L 4.0 3.9    Chloride 94 - 109 mmol/L 109 105    Glucose 70 - 99 mg/dL 86 119(H)    Urea Nitrogen 7 - 30 mg/dL 14 12    Creatinine 0.52 - 1.04 mg/dL 0.66 0.70    Calcium (Total) 8.5 - 10.1 mg/dL 9.3 9.2    Protein (Total) 6.8 - 8.8 g/dL 7.6 7.6    Albumin 3.4 - 5.0 g/dL 4.0 4.0    Alkaline Phosphatase 40 - 150 U/L 88 94    AST 0 - 45 U/L 14 16    ALT 0 - 50 U/L " 24 26    [facility] [place] [facility] 78 - 100 fl 91 90               Primary Care Provider Fax #    Physician No Ref-Primary [phone]       No address on file        Equal Access to Services     LINUS DREW : Natalia aad ku hadsabra Lange, dallas twanjuve, joya yu, linda caldwell [facility] [phone].    ATENCIÓN: Si habla español, tiene a gregory disposición servicios gratuitos de asistencia lingüística. Llame al [phone].    We comply with applicable federal civil rights laws and [place] laws. We do not discriminate on the basis of race, color, national origin, age, disability, sex, sexual orientation, or gender identity.            Thank you!     Thank you for choosing [facility] BLOOD AND MARROW TRANSPLANT  for your care. Our goal is always to provide you with excellent care. Hearing back from our patients is one way we can continue to improve our services. Please take a few minutes to complete the written survey that you may receive in the mail after your visit with us. Thank you!             Your Updated Medication List - Protect others around you: Learn how to safely use, store and throw away your medicines at www.disposemymeds.org.          This list is accurate as of 6/11/18  1:43 PM.  Always use your most recent med list.                   Brand Name Dispense Instructions for use Diagnosis    cyclobenzaprine 10 MG tablet    FLEXERIL     Take 10 mg by mouth as needed        ibuprofen 800 MG tablet    ADVIL/MOTRIN     Take 800 mg by mouth as needed        IMITREX 100 MG tablet   Generic drug:  SUMAtriptan      Take 100 mg by mouth as needed        magnesium sulfate 500 mg/mL Soln      Take by mouth as needed        Multi-vitamin Tabs tablet      Take 1 tablet by mouth daily        naproxen sodium 550 MG tablet    ANAPROX     Take 550 mg by mouth as needed

## 2018-06-11 NOTE — MR AVS SNAPSHOT
"              After Visit Summary   6/11/2018    Xiao Rebollar    MRN: [id number]           Patient Information     Date Of Birth          1960        Visit Information        Provider Department      6/11/2018 12:30 PM  BMT RODRÍGUEZ #1 [facility] Blood and Marrow Transplant        Today's Diagnoses     Donor of stem cell    -  1          [facility] ([facility] [place]  [address] [place] [address]  Phone: [phone]  Clinic Hours:   Monday-Thursday:7am to 7pm   Friday: 7am to 5pm   Weekends and holidays:    8am to noon (in general)  If your fever is 100.5  or greater,   call the clinic.  After hours call the   hospital at [phone] or   [phone]. Ask for the BMT   fellow on-call            Follow-ups after your visit        Who to contact     If you have questions or need follow up information about today's clinic visit or your schedule please contact [facility] BLOOD AND MARROW TRANSPLANT directly at [phone].  Normal or non-critical lab and imaging results will be communicated to you by Persadohart, letter or phone within 4 business days after the clinic has received the results. If you do not hear from us within 7 days, please contact the clinic through Mesitist or phone. If you have a critical or abnormal lab result, we will notify you by phone as soon as possible.  Submit refill requests through SunnyBump or call your pharmacy and they will forward the refill request to us. Please allow 3 business days for your refill to be completed.          Additional Information About Your Visit        PersadoharBack9 Network Information     SunnyBump lets you send messages to your doctor, view your test results, renew your prescriptions, schedule appointments and more. To sign up, go to [email or web address] . Click on \"Log in\" on the left side of the screen, which will take you to the Welcome page. Then click on \"Sign up Now\" on the right side of the page.     You will be asked to enter the access code listed " "below, as well as some personal information. Please follow the directions to create your username and password.     Your access code is: [email or web address]  Expires: 2018  6:30 AM     Your access code will  in 90 days. If you need help or a new code, please call your [place] clinic or [phone].        Care EveryWhere ID     This is your Care EveryWhere ID. This could be used by other organizations to access your [place] medical records  [id number]        Your Vitals Were     Pulse Temperature Respirations Height Pulse Oximetry BMI (Body Mass Index)    87 97  F (36.1  C) (Oral) 16 1.595 m (5' 2.8\") 97% 32.35 kg/m2       Blood Pressure from Last 3 Encounters:   18 127/84   18 127/84   18 139/86    Weight from Last 3 Encounters:   18 82.3 kg (181 lb 7 oz)   18 82.3 kg (181 lb 6.4 oz)   18 83.5 kg (184 lb 1.6 oz)              Today, you had the following     No orders found for display       Recent Review Flowsheet Data     BMT Recent Results Latest Ref Rng & Units 2018    WBC 4.0 - 11.0 10e9/L 8.0 9.6    Hemoglobin 11.7 - 15.7 g/dL 15.2 15.3    Platelet Count 150 - 450 10e9/L 234 252    Neutrophils (Absolute) 1.6 - 8.3 10e9/L 4.2 5.5    INR 0.86 - 1.14 0.95 1.01    Sodium 133 - 144 mmol/L 142 137    Potassium 3.4 - 5.3 mmol/L 4.0 3.9    Chloride 94 - 109 mmol/L 109 105    Glucose 70 - 99 mg/dL 86 119(H)    Urea Nitrogen 7 - 30 mg/dL 14 12    Creatinine 0.52 - 1.04 mg/dL 0.66 0.70    Calcium (Total) 8.5 - 10.1 mg/dL 9.3 9.2    Protein (Total) 6.8 - 8.8 g/dL 7.6 7.6    Albumin 3.4 - 5.0 g/dL 4.0 4.0    Alkaline Phosphatase 40 - 150 U/L 88 94    AST 0 - 45 U/L 14 16    ALT 0 - 50 U/L 24 26    MCV 78 - 100 fl 91 90               Primary Care Provider Fax #    Physician No Ref-Primary [phone]       No address on file        Equal Access to Services     LINUS DREW AH: Natalia Lange, dallas arroyo, linda jackson " timothy leticianereyda cristobalnancy stanley ah. So [facility] [phone].    ATENCIÓN: Si gerhardla hitesh, tiene a gregory disposición servicios gratuitos de asistencia lingüística. Sandra al [phone].    We comply with applicable federal civil rights laws and [place] laws. We do not discriminate on the basis of race, color, national origin, age, disability, sex, sexual orientation, or gender identity.            Thank you!     Thank you for choosing [facility] BLOOD AND MARROW TRANSPLANT  for your care. Our goal is always to provide you with excellent care. Hearing back from our patients is one way we can continue to improve our services. Please take a few minutes to complete the written survey that you may receive in the mail after your visit with us. Thank you!             Your Updated Medication List - Protect others around you: Learn how to safely use, store and throw away your medicines at www.disposemymeds.org.          This list is accurate as of 6/11/18  4:38 PM.  Always use your most recent med list.                   Brand Name Dispense Instructions for use Diagnosis    cyclobenzaprine 10 MG tablet    FLEXERIL     Take 10 mg by mouth as needed        ibuprofen 800 MG tablet    ADVIL/MOTRIN     Take 800 mg by mouth as needed        IMITREX 100 MG tablet   Generic drug:  SUMAtriptan      Take 100 mg by mouth as needed        magnesium sulfate 500 mg/mL Soln      Take by mouth as needed        Multi-vitamin Tabs tablet      Take 1 tablet by mouth daily        naproxen sodium 550 MG tablet    ANAPROX     Take 550 mg by mouth as needed

## 2018-06-11 NOTE — MR AVS SNAPSHOT
"              After Visit Summary   6/11/2018    Xiao Rebollar    MRN: [id number]           Patient Information     Date Of Birth          1960        Visit Information        Provider Department      6/11/2018 12:00 PM Coordinator, James Bmt Nurse;  2 114 CONSULT RM [facility] Blood and Marrow Transplant        Today's Diagnoses     Bone marrow donor    -  1    Stem cell donor        Personal history of diseases of blood and blood-forming organs              [facility] [place] [place]  [address] [place] [address]  Phone: [phone]  Clinic Hours:   Monday-Thursday:7am to 7pm   Friday: 7am to 5pm   Weekends and holidays:    8am to noon (in general)  If your fever is 100.5  or greater,   call the clinic.  After hours call the   hospital at [phone] or   [phone]. Ask for the BMT   fellow on-call            Follow-ups after your visit        Who to contact     If you have questions or need follow up information about today's clinic visit or your schedule please contact [facility] BLOOD AND MARROW TRANSPLANT directly at [phone].  Normal or non-critical lab and imaging results will be communicated to you by OptiScan Biomedicalhart, letter or phone within 4 business days after the clinic has received the results. If you do not hear from us within 7 days, please contact the clinic through Trendslidet or phone. If you have a critical or abnormal lab result, we will notify you by phone as soon as possible.  Submit refill requests through Fabric7 Systems or call your pharmacy and they will forward the refill request to us. Please allow 3 business days for your refill to be completed.          Additional Information About Your Visit        MyChart Information     Fabric7 Systems lets you send messages to your doctor, view your test results, renew your prescriptions, schedule appointments and more. To sign up, go to [email or web address] . Click on \"Log in\" on the left side of the screen, which will take you to the " "Welcome page. Then click on \"Sign up Now\" on the right side of the page.     You will be asked to enter the access code listed below, as well as some personal information. Please follow the directions to create your username and password.     Your access code is: [email or web address]  Expires: 2018  6:30 AM     Your access code will  in 90 days. If you need help or a new code, please call your [place] clinic or [phone].        Care EveryWhere ID     This is your Care EveryWhere ID. This could be used by other organizations to access your [place] medical records  [id number]         Blood Pressure from Last 3 Encounters:   18 127/84   18 139/86   18 139/86    Weight from Last 3 Encounters:   18 82.3 kg (181 lb 6.4 oz)   18 83.5 kg (184 lb 1.6 oz)   18 83.5 kg (184 lb 1.6 oz)              We Performed the Following     Appointment with BMT  Coordinator         Recent Review Flowsheet Data     BMT Recent Results Latest Ref Rng & Units 2018    WBC 4.0 - 11.0 10e9/L 8.0 9.6    Hemoglobin 11.7 - 15.7 g/dL 15.2 15.3    Platelet Count 150 - 450 10e9/L 234 252    Neutrophils (Absolute) 1.6 - 8.3 10e9/L 4.2 5.5    INR 0.86 - 1.14 0.95 -    Sodium 133 - 144 mmol/L 142 -    Potassium 3.4 - 5.3 mmol/L 4.0 -    Chloride 94 - 109 mmol/L 109 -    Glucose 70 - 99 mg/dL 86 -    Urea Nitrogen 7 - 30 mg/dL 14 -    Creatinine 0.52 - 1.04 mg/dL 0.66 -    Calcium (Total) 8.5 - 10.1 mg/dL 9.3 -    Protein (Total) 6.8 - 8.8 g/dL 7.6 -    Albumin 3.4 - 5.0 g/dL 4.0 -    Alkaline Phosphatase 40 - 150 U/L 88 -    AST 0 - 45 U/L 14 -    ALT 0 - 50 U/L 24 -    MCV 78 - 100 fl 91 90               Primary Care Provider Fax #    Physician No Ref-Primary [phone]       No address on file        Equal Access to Services     LINUS DREW : Natalia Lange, [place] arroyo, linda jackson. So [facility] " [phone].    ATENCIÓN: Si yahir proctor, tiene a gregory disposición servicios gratuitos de asistencia lingüística. Sandra al [phone].    We comply with applicable federal civil rights laws and [place] laws. We do not discriminate on the basis of race, color, national origin, age, disability, sex, sexual orientation, or gender identity.            Thank you!     Thank you for choosing [facility] BLOOD AND MARROW TRANSPLANT  for your care. Our goal is always to provide you with excellent care. Hearing back from our patients is one way we can continue to improve our services. Please take a few minutes to complete the written survey that you may receive in the mail after your visit with us. Thank you!             Your Updated Medication List - Protect others around you: Learn how to safely use, store and throw away your medicines at www.disposemymeds.org.          This list is accurate as of 6/11/18 12:31 PM.  Always use your most recent med list.                   Brand Name Dispense Instructions for use Diagnosis    cyclobenzaprine 10 MG tablet    FLEXERIL     Take 10 mg by mouth as needed        ibuprofen 800 MG tablet    ADVIL/MOTRIN     Take 800 mg by mouth as needed        IMITREX 100 MG tablet   Generic drug:  SUMAtriptan      Take 100 mg by mouth as needed        magnesium sulfate 500 mg/mL Soln      Take by mouth as needed        Multi-vitamin Tabs tablet      Take 1 tablet by mouth daily        naproxen sodium 550 MG tablet    ANAPROX     Take 550 mg by mouth as needed

## 2018-06-11 NOTE — PROGRESS NOTES
Met with Xiao today briefly as this is a second workup for her. I gave her updated consents, and discussed that I would be calling her once her son has cleared for his bmt. Xiao verbalized understanding of this information and has contact information to the bmt office. Pt has no other questions at this time.

## 2018-06-12 LAB
ABO + RH BLD: NORMAL
ABO + RH BLD: NORMAL
BLD GP AB SCN SERPL QL: NORMAL
BLOOD BANK CMNT PATIENT-IMP: NORMAL
CMV IGG SERPL QL IA: 7.4 AI (ref 0–0.8)
EBV VCA IGG SER QL IA: >8 AI (ref 0–0.8)
HSV1 IGG SERPL QL IA: >8 AI (ref 0–0.8)
HSV2 IGG SERPL QL IA: <0.2 AI (ref 0–0.8)
SPECIMEN EXP DATE BLD: NORMAL

## 2018-06-13 LAB — LAB SCANNED RESULT: NORMAL

## 2018-06-20 ENCOUNTER — TELEPHONE (OUTPATIENT)
Dept: TRANSPLANT | Facility: CLINIC | Age: 58
End: 2018-06-20

## 2018-06-20 NOTE — TELEPHONE ENCOUNTER
I called and left pt a message regarding her appointments for next Wednesday starting at 11 am , as well as her harvest time for Thursday. I requested she call me back to verify this information. Will await a call back

## 2018-06-26 NOTE — PROGRESS NOTES
BMT Clinic Progress Note    Patient Name: Xiao Rebollar  Patient MRN: [id number]  Patient : 1960    Abbreviated H&P and Pre-sedation Assessment for bone marrow harvest with sedation    Chief complaint and/or reason for Procedure: stem cell donor    Planned level of sedation: General anesthesia    History of problems with sedation: (patient or family hx): No    ASA Assessment Category: 1 - Healthy patient, no medical problems    History of sleep apnea: No    History of blood thinners: No     Appropriate NPO status: Yes    Current tobacco use: No    Any recent fever, cough, chest or sinus congestion, SOB, weight loss, chest pain, diarrhea or constipation. No    Medications     Current Outpatient Prescriptions:      cyclobenzaprine (FLEXERIL) 10 MG tablet, Take 10 mg by mouth as needed, Disp: , Rfl:      ibuprofen (ADVIL/MOTRIN) 800 MG tablet, Take 800 mg by mouth as needed, Disp: , Rfl:      magnesium sulfate 500 mg/mL SOLN, Take by mouth as needed, Disp: , Rfl:      multivitamin, therapeutic with minerals (MULTI-VITAMIN) TABS tablet, Take 1 tablet by mouth daily, Disp: , Rfl:      naproxen sodium (ANAPROX) 550 MG tablet, Take 550 mg by mouth as needed, Disp: , Rfl:      SUMAtriptan (IMITREX) 100 MG tablet, Take 100 mg by mouth as needed, Disp: , Rfl:     Pt states she only takes flexeril with naproxen at onset of migraine, sumatriptan as rescue migraine therapy Not regularly.    Allergies  Review of patient's allergies indicates no known allergies.    PMH:  2012- Internal bleeding - ruptured viscus  Arthritis  GERD- manage with tums    PSH   Right total hip replacement ~   Open laporotomy-- GI bleed + open colon  - Ventral hernia repair- 2013  4 csections  Total hysterectomy   - Mild post operative nausea- otherwise tolerate anethesia well.   - Nerve stimulator surgically placed      Focused Physical exam pertinent to procedure:          (Details of heart, lung, ASA assessment and mallampati  "assessment in pre procedure assessment flowsheet)  General- healthy,alert,no distress   Recent vital signs-  /72  Pulse 79  Temp 97.9  F (36.6  C) (Oral)  Resp 16  Ht 1.595 m (5' 2.8\")  Wt 81.9 kg (180 lb 8 oz)  SpO2 97%  BMI 32.18 kg/m2  HEART-regular rate and rhythm and no murmurs, gallops, or rub  LUNGS-Clear to Ausculation  OROPHARYNGEAL - MALLAMPATTI- Class I (visualization of the soft palate, fauces, uvula, anterior and posterior pillars)    Repeat EKG (nerve stimulator turned off): is sinus rhythm ,normal axis, no st wave abnormalities    A/P:Reviewed history, medications, allergies, clinical information and pre procedure assessment. The patient was informed of the risks and benefits of the procedure.  They would like to proceed.  Xiao Rebollar is approved for use of sedation during their procedure as noted above.      Fabienne Nina PAC  [phone]  "

## 2018-06-27 ENCOUNTER — ONCOLOGY VISIT (OUTPATIENT)
Dept: TRANSPLANT | Facility: CLINIC | Age: 58
End: 2018-06-27
Attending: PHYSICIAN ASSISTANT

## 2018-06-27 ENCOUNTER — APPOINTMENT (OUTPATIENT)
Dept: LAB | Facility: CLINIC | Age: 58
End: 2018-06-27
Attending: INTERNAL MEDICINE

## 2018-06-27 ENCOUNTER — DOCUMENTATION ONLY (OUTPATIENT)
Dept: TRANSPLANT | Facility: CLINIC | Age: 58
End: 2018-06-27

## 2018-06-27 VITALS
DIASTOLIC BLOOD PRESSURE: 72 MMHG | TEMPERATURE: 97.9 F | RESPIRATION RATE: 16 BRPM | HEART RATE: 79 BPM | OXYGEN SATURATION: 97 % | HEIGHT: 63 IN | WEIGHT: 180.5 LBS | SYSTOLIC BLOOD PRESSURE: 127 MMHG | BODY MASS INDEX: 31.98 KG/M2

## 2018-06-27 DIAGNOSIS — Z52.3 BONE MARROW DONOR: Primary | ICD-10-CM

## 2018-06-27 LAB
ABO + RH BLD: NORMAL
ABO + RH BLD: NORMAL
ALBUMIN SERPL-MCNC: 3.8 G/DL (ref 3.4–5)
ALP SERPL-CCNC: 86 U/L (ref 40–150)
ALT SERPL W P-5'-P-CCNC: 27 U/L (ref 0–50)
ANION GAP SERPL CALCULATED.3IONS-SCNC: 7 MMOL/L (ref 3–14)
AST SERPL W P-5'-P-CCNC: 14 U/L (ref 0–45)
BILIRUB SERPL-MCNC: 0.4 MG/DL (ref 0.2–1.3)
BLD GP AB SCN SERPL QL: NORMAL
BLOOD BANK CMNT PATIENT-IMP: NORMAL
BUN SERPL-MCNC: 11 MG/DL (ref 7–30)
CALCIUM SERPL-MCNC: 9.1 MG/DL (ref 8.5–10.1)
CHLORIDE SERPL-SCNC: 107 MMOL/L (ref 94–109)
CO2 SERPL-SCNC: 25 MMOL/L (ref 20–32)
CREAT SERPL-MCNC: 0.68 MG/DL (ref 0.52–1.04)
GFR SERPL CREATININE-BSD FRML MDRD: 88 ML/MIN/1.7M2
GLUCOSE SERPL-MCNC: 107 MG/DL (ref 70–99)
HCG UR QL: NEGATIVE
POTASSIUM SERPL-SCNC: 3.7 MMOL/L (ref 3.4–5.3)
PROT SERPL-MCNC: 7.3 G/DL (ref 6.8–8.8)
SODIUM SERPL-SCNC: 140 MMOL/L (ref 133–144)
SPECIMEN EXP DATE BLD: NORMAL

## 2018-06-27 PROCEDURE — 93010 ELECTROCARDIOGRAM REPORT: CPT | Mod: ZP | Performed by: INTERNAL MEDICINE

## 2018-06-27 PROCEDURE — 86901 BLOOD TYPING SEROLOGIC RH(D): CPT | Performed by: PHYSICIAN ASSISTANT

## 2018-06-27 PROCEDURE — 81025 URINE PREGNANCY TEST: CPT | Performed by: PHYSICIAN ASSISTANT

## 2018-06-27 PROCEDURE — G0463 HOSPITAL OUTPT CLINIC VISIT: HCPCS | Mod: ZF

## 2018-06-27 PROCEDURE — 93005 ELECTROCARDIOGRAM TRACING: CPT

## 2018-06-27 PROCEDURE — 36415 COLL VENOUS BLD VENIPUNCTURE: CPT

## 2018-06-27 PROCEDURE — 80053 COMPREHEN METABOLIC PANEL: CPT | Performed by: PHYSICIAN ASSISTANT

## 2018-06-27 PROCEDURE — 86850 RBC ANTIBODY SCREEN: CPT | Performed by: PHYSICIAN ASSISTANT

## 2018-06-27 PROCEDURE — 86900 BLOOD TYPING SEROLOGIC ABO: CPT | Performed by: PHYSICIAN ASSISTANT

## 2018-06-27 ASSESSMENT — PAIN SCALES - GENERAL: PAINLEVEL: NO PAIN (0)

## 2018-06-27 NOTE — TELEPHONE ENCOUNTER
Met with patient, and utilizing teach back methodology, described bone marrow harvest procedure. Reviewed general anesthesia, NPO guidelines, skin prep and harvest requirements. Informed pt. she will be admitted as outpatient to hospital following procedure, with plans for discharge to home late in the day if she meets discharge criteria.  Plan verbalized understanding and signed surgical consent.  All questions were answered to patient satisfaction.

## 2018-06-27 NOTE — NURSING NOTE
"Oncology Rooming Note    June 27, 2018 11:28 AM   Xiao Rebollar is a 58 year old female who presents for:    Chief Complaint   Patient presents with     Blood Draw     pt here for venipuncture/urine collected, vitals completed, pt checked in for appt.      RECHECK     Bone marrow donor     Initial Vitals: /72  Pulse 79  Temp 97.9  F (36.6  C) (Oral)  Resp 16  Ht 1.595 m (5' 2.8\")  Wt 81.9 kg (180 lb 8 oz)  SpO2 97%  BMI 32.18 kg/m2 Estimated body mass index is 32.18 kg/(m^2) as calculated from the following:    Height as of this encounter: 1.595 m (5' 2.8\").    Weight as of this encounter: 81.9 kg (180 lb 8 oz). Body surface area is 1.9 meters squared.  No Pain (0) Comment: Data Unavailable   No LMP recorded.  Allergies reviewed: Yes  Medications reviewed: Yes    Medications: Medication refills not needed today.  Pharmacy name entered into EPIC: Data Unavailable    Clinical concerns:  No new concerns  Provider was notified.    5 minutes for nursing intake (face to face time)     Cely Robert MA              "

## 2018-06-27 NOTE — NURSING NOTE
Chief Complaint   Patient presents with     Blood Draw     pt here for venipuncture/urine collected, vitals completed, pt checked in for appt.      Tiki Moncada, CMA

## 2018-06-27 NOTE — MR AVS SNAPSHOT
After Visit Summary   6/27/2018    Xiao Rebollar    MRN: [id number]           Patient Information     Date Of Birth          1960        Visit Information        Provider Department      6/27/2018 11:30 AM  BMT RODRÍGUEZ #2 [facility] Blood and Marrow Transplant        Today's Diagnoses     Bone marrow donor    -  1          [facility] [place] [place]  [address] [place] [address]  Phone: [phone]  Clinic Hours:   Monday-Thursday:7am to 7pm   Friday: 7am to 5pm   Weekends and holidays:    8am to noon (in general)  If your fever is 100.5  or greater,   call the clinic.  After hours call the   hospital at [phone] or   [phone]. Ask for the BMT   fellow on-call            Follow-ups after your visit        Your next 10 appointments already scheduled     Jun 28, 2018   Procedure with Estefania Sim MD   [facility] [place], Same Day Surgery (--)    [address] [address] [phone]              Who to contact     If you have questions or need follow up information about today's clinic visit or your schedule please contact [facility] BLOOD AND MARROW TRANSPLANT directly at [phone].  Normal or non-critical lab and imaging results will be communicated to you by [employer] Reimburse[employer]ah[employer], letter or phone within 4 business days after the clinic has received the results. If you do not hear from us within 7 days, please contact the clinic through [employer] or phone. If you have a critical or abnormal lab result, we will notify you by phone as soon as possible.  Submit refill requests through WeGoOut or call your pharmacy and they will forward the refill request to us. Please allow 3 business days for your refill to be completed.          Additional Information About Your Visit        [employer] Reimbursements of [employer]ah[employer] Information     WeGoOut lets you send messages to your doctor, view your test results, renew your prescriptions, schedule appointments and more. To sign up, go to [email or web address] .  "Click on \"Log in\" on the left side of the screen, which will take you to the Welcome page. Then click on \"Sign up Now\" on the right side of the page.     You will be asked to enter the access code listed below, as well as some personal information. Please follow the directions to create your username and password.     Your access code is: [email or web address]  Expires: 2018  6:30 AM     Your access code will  in 90 days. If you need help or a new code, please call your [place] clinic or [phone].        Care EveryWhere ID     This is your Care EveryWhere ID. This could be used by other organizations to access your [place] medical records  [id number]        Your Vitals Were     Pulse Temperature Respirations Height Pulse Oximetry BMI (Body Mass Index)    79 97.9  F (36.6  C) (Oral) 16 1.595 m (5' 2.8\") 97% 32.18 kg/m2       Blood Pressure from Last 3 Encounters:   18 127/72   18 127/84   18 127/84    Weight from Last 3 Encounters:   18 81.9 kg (180 lb 8 oz)   18 82.3 kg (181 lb 7 oz)   18 82.3 kg (181 lb 6.4 oz)              We Performed the Following     ABO/Rh type and screen     Comprehensive metabolic panel     EKG 12-lead complete w/read - Clinics - NOW     HCG qualitative urine        Recent Review Flowsheet Data     BMT Recent Results Latest Ref Rng & Units 2018    WBC 4.0 - 11.0 10e9/L 8.0 9.6 -    Hemoglobin 11.7 - 15.7 g/dL 15.2 15.3 -    Platelet Count 150 - 450 10e9/L 234 252 -    Neutrophils (Absolute) 1.6 - 8.3 10e9/L 4.2 5.5 -    INR 0.86 - 1.14 0.95 1.01 -    Sodium 133 - 144 mmol/L 142 137 140    Potassium 3.4 - 5.3 mmol/L 4.0 3.9 3.7    Chloride 94 - 109 mmol/L 109 105 107    Glucose 70 - 99 mg/dL 86 119(H) 107(H)    Urea Nitrogen 7 - 30 mg/dL 14 12 11    Creatinine 0.52 - 1.04 mg/dL 0.66 0.70 0.68    Calcium (Total) 8.5 - 10.1 mg/dL 9.3 9.2 9.1    Protein (Total) 6.8 - 8.8 g/dL 7.6 7.6 7.3    Albumin 3.4 - 5.0 g/dL 4.0 4.0 3.8 "    Alkaline Phosphatase 40 - 150 U/L 88 94 86    AST 0 - 45 U/L 14 16 14    ALT 0 - 50 U/L 24 26 27    MCV 78 - 100 fl 91 90 -               Primary Care Provider Fax #    Physician No Ref-Primary [phone]       No address on file        Equal Access to Services     LINUS DREW : Hadii aad ku abdirahmano Sojcali, waaxda luqadaha, qaybta kaalmada adeegyada, linda jordanjune angel. So [facility] [phone].    ATENCIÓN: Si habla español, tiene a gregory disposición servicios gratuitos de asistencia lingüística. Llame al [phone].    We comply with applicable federal civil rights laws and [place] laws. We do not discriminate on the basis of race, color, national origin, age, disability, sex, sexual orientation, or gender identity.            Thank you!     Thank you for choosing [facility] BLOOD AND MARROW TRANSPLANT  for your care. Our goal is always to provide you with excellent care. Hearing back from our patients is one way we can continue to improve our services. Please take a few minutes to complete the written survey that you may receive in the mail after your visit with us. Thank you!             Your Updated Medication List - Protect others around you: Learn how to safely use, store and throw away your medicines at www.disposemymeds.org.          This list is accurate as of 6/27/18 12:35 PM.  Always use your most recent med list.                   Brand Name Dispense Instructions for use Diagnosis    cyclobenzaprine 10 MG tablet    FLEXERIL     Take 10 mg by mouth as needed        ibuprofen 800 MG tablet    ADVIL/MOTRIN     Take 800 mg by mouth as needed        IMITREX 100 MG tablet   Generic drug:  SUMAtriptan      Take 100 mg by mouth as needed        magnesium sulfate 500 mg/mL Soln      Take by mouth as needed        Multi-vitamin Tabs tablet      Take 1 tablet by mouth daily        naproxen sodium 550 MG tablet    ANAPROX     Take 550 mg by mouth as needed

## 2018-06-28 ENCOUNTER — HOSPITAL ENCOUNTER (OUTPATIENT)
Facility: CLINIC | Age: 58
Discharge: HOME | End: 2018-06-28
Attending: INTERNAL MEDICINE | Admitting: INTERNAL MEDICINE

## 2018-06-28 ENCOUNTER — ANESTHESIA (OUTPATIENT)
Dept: SURGERY | Facility: CLINIC | Age: 58
End: 2018-06-28

## 2018-06-28 ENCOUNTER — ANESTHESIA EVENT (OUTPATIENT)
Dept: SURGERY | Facility: CLINIC | Age: 58
End: 2018-06-28

## 2018-06-28 ENCOUNTER — DOCUMENTATION ONLY (OUTPATIENT)
Dept: TRANSPLANT | Facility: CLINIC | Age: 58
End: 2018-06-28

## 2018-06-28 ENCOUNTER — SURGERY (OUTPATIENT)
Age: 58
End: 2018-06-28

## 2018-06-28 VITALS
TEMPERATURE: 97.3 F | DIASTOLIC BLOOD PRESSURE: 74 MMHG | RESPIRATION RATE: 16 BRPM | OXYGEN SATURATION: 96 % | BODY MASS INDEX: 33.51 KG/M2 | SYSTOLIC BLOOD PRESSURE: 115 MMHG | WEIGHT: 182.1 LBS | HEIGHT: 62 IN

## 2018-06-28 DIAGNOSIS — Z52.3 BONE MARROW DONOR: Primary | ICD-10-CM

## 2018-06-28 PROBLEM — Z52.001 STEM CELL DONOR: Status: ACTIVE | Noted: 2018-06-28

## 2018-06-28 LAB
ERYTHROCYTE [DISTWIDTH] IN BLOOD BY AUTOMATED COUNT: 13.4 % (ref 10–15)
GLUCOSE BLDC GLUCOMTR-MCNC: 103 MG/DL (ref 70–99)
HCT VFR BLD AUTO: 39 % (ref 35–47)
HGB BLD-MCNC: 12.5 G/DL (ref 11.7–15.7)
INTERPRETATION ECG - MUSE: NORMAL
MCH RBC QN AUTO: 30.3 PG (ref 26.5–33)
MCHC RBC AUTO-ENTMCNC: 32.1 G/DL (ref 31.5–36.5)
MCV RBC AUTO: 95 FL (ref 78–100)
PLATELET # BLD AUTO: 223 10E9/L (ref 150–450)
RBC # BLD AUTO: 4.12 10E12/L (ref 3.8–5.2)
WBC # BLD AUTO: 12.6 10E9/L (ref 4–11)
WBC MAR: 17.9 10E9/L
WBC MAR: 28.1 10E9/L

## 2018-06-28 PROCEDURE — 36000053 ZZH SURGERY LEVEL 2 EA 15 ADDTL MIN - UMMC: Performed by: INTERNAL MEDICINE

## 2018-06-28 PROCEDURE — 40000170 ZZH STATISTIC PRE-PROCEDURE ASSESSMENT II: Performed by: INTERNAL MEDICINE

## 2018-06-28 PROCEDURE — 36000051 ZZH SURGERY LEVEL 2 1ST 30 MIN - UMMC: Performed by: INTERNAL MEDICINE

## 2018-06-28 PROCEDURE — 25000566 ZZH SEVOFLURANE, EA 15 MIN: Performed by: INTERNAL MEDICINE

## 2018-06-28 PROCEDURE — 25000128 H RX IP 250 OP 636: Performed by: NURSE ANESTHETIST, CERTIFIED REGISTERED

## 2018-06-28 PROCEDURE — 37000009 ZZH ANESTHESIA TECHNICAL FEE, EACH ADDTL 15 MIN: Performed by: INTERNAL MEDICINE

## 2018-06-28 PROCEDURE — 37000008 ZZH ANESTHESIA TECHNICAL FEE, 1ST 30 MIN: Performed by: INTERNAL MEDICINE

## 2018-06-28 PROCEDURE — 36415 COLL VENOUS BLD VENIPUNCTURE: CPT | Performed by: PHYSICIAN ASSISTANT

## 2018-06-28 PROCEDURE — 25000125 ZZHC RX 250: Performed by: INTERNAL MEDICINE

## 2018-06-28 PROCEDURE — 82962 GLUCOSE BLOOD TEST: CPT

## 2018-06-28 PROCEDURE — 85048 AUTOMATED LEUKOCYTE COUNT: CPT | Performed by: INTERNAL MEDICINE

## 2018-06-28 PROCEDURE — 27210794 ZZH OR GENERAL SUPPLY STERILE: Performed by: INTERNAL MEDICINE

## 2018-06-28 PROCEDURE — 25800025 ZZH RX 258: Performed by: INTERNAL MEDICINE

## 2018-06-28 PROCEDURE — P9041 ALBUMIN (HUMAN),5%, 50ML: HCPCS | Performed by: NURSE ANESTHETIST, CERTIFIED REGISTERED

## 2018-06-28 PROCEDURE — 85027 COMPLETE CBC AUTOMATED: CPT | Performed by: PHYSICIAN ASSISTANT

## 2018-06-28 PROCEDURE — 25000128 H RX IP 250 OP 636: Performed by: ANESTHESIOLOGY

## 2018-06-28 PROCEDURE — 71000014 ZZH RECOVERY PHASE 1 LEVEL 2 FIRST HR: Performed by: INTERNAL MEDICINE

## 2018-06-28 PROCEDURE — 25000125 ZZHC RX 250: Performed by: NURSE ANESTHETIST, CERTIFIED REGISTERED

## 2018-06-28 PROCEDURE — 25000125 ZZHC RX 250: Performed by: ANESTHESIOLOGY

## 2018-06-28 RX ORDER — SODIUM CHLORIDE, SODIUM GLUCONATE, SODIUM ACETATE, POTASSIUM CHLORIDE AND MAGNESIUM CHLORIDE 526; 502; 368; 37; 30 MG/100ML; MG/100ML; MG/100ML; MG/100ML; MG/100ML
INJECTION, SOLUTION INTRAVENOUS PRN
Status: DISCONTINUED | OUTPATIENT
Start: 2018-06-28 | End: 2018-06-28 | Stop reason: HOSPADM

## 2018-06-28 RX ORDER — ONDANSETRON 2 MG/ML
4 INJECTION INTRAMUSCULAR; INTRAVENOUS EVERY 30 MIN PRN
Status: DISCONTINUED | OUTPATIENT
Start: 2018-06-28 | End: 2018-06-28 | Stop reason: HOSPADM

## 2018-06-28 RX ORDER — PROPOFOL 10 MG/ML
INJECTION, EMULSION INTRAVENOUS PRN
Status: DISCONTINUED | OUTPATIENT
Start: 2018-06-28 | End: 2018-06-28

## 2018-06-28 RX ORDER — LIDOCAINE HYDROCHLORIDE 20 MG/ML
INJECTION, SOLUTION INFILTRATION; PERINEURAL PRN
Status: DISCONTINUED | OUTPATIENT
Start: 2018-06-28 | End: 2018-06-28

## 2018-06-28 RX ORDER — FENTANYL CITRATE 50 UG/ML
25-50 INJECTION, SOLUTION INTRAMUSCULAR; INTRAVENOUS
Status: DISCONTINUED | OUTPATIENT
Start: 2018-06-28 | End: 2018-06-28 | Stop reason: HOSPADM

## 2018-06-28 RX ORDER — DEXAMETHASONE SODIUM PHOSPHATE 4 MG/ML
INJECTION, SOLUTION INTRA-ARTICULAR; INTRALESIONAL; INTRAMUSCULAR; INTRAVENOUS; SOFT TISSUE PRN
Status: DISCONTINUED | OUTPATIENT
Start: 2018-06-28 | End: 2018-06-28

## 2018-06-28 RX ORDER — NALOXONE HYDROCHLORIDE 0.4 MG/ML
.1-.4 INJECTION, SOLUTION INTRAMUSCULAR; INTRAVENOUS; SUBCUTANEOUS
Status: DISCONTINUED | OUTPATIENT
Start: 2018-06-28 | End: 2018-06-28 | Stop reason: HOSPADM

## 2018-06-28 RX ORDER — SODIUM CHLORIDE, SODIUM LACTATE, POTASSIUM CHLORIDE, CALCIUM CHLORIDE 600; 310; 30; 20 MG/100ML; MG/100ML; MG/100ML; MG/100ML
INJECTION, SOLUTION INTRAVENOUS CONTINUOUS PRN
Status: DISCONTINUED | OUTPATIENT
Start: 2018-06-28 | End: 2018-06-28

## 2018-06-28 RX ORDER — LIDOCAINE 40 MG/G
CREAM TOPICAL
Status: DISCONTINUED | OUTPATIENT
Start: 2018-06-28 | End: 2018-06-28 | Stop reason: HOSPADM

## 2018-06-28 RX ORDER — FENTANYL CITRATE 50 UG/ML
INJECTION, SOLUTION INTRAMUSCULAR; INTRAVENOUS PRN
Status: DISCONTINUED | OUTPATIENT
Start: 2018-06-28 | End: 2018-06-28

## 2018-06-28 RX ORDER — ALBUMIN, HUMAN INJ 5% 5 %
SOLUTION INTRAVENOUS CONTINUOUS PRN
Status: DISCONTINUED | OUTPATIENT
Start: 2018-06-28 | End: 2018-06-28

## 2018-06-28 RX ORDER — ONDANSETRON 4 MG/1
4 TABLET, ORALLY DISINTEGRATING ORAL EVERY 30 MIN PRN
Status: DISCONTINUED | OUTPATIENT
Start: 2018-06-28 | End: 2018-06-28 | Stop reason: HOSPADM

## 2018-06-28 RX ORDER — ONDANSETRON 2 MG/ML
INJECTION INTRAMUSCULAR; INTRAVENOUS PRN
Status: DISCONTINUED | OUTPATIENT
Start: 2018-06-28 | End: 2018-06-28

## 2018-06-28 RX ORDER — SODIUM CHLORIDE, SODIUM LACTATE, POTASSIUM CHLORIDE, CALCIUM CHLORIDE 600; 310; 30; 20 MG/100ML; MG/100ML; MG/100ML; MG/100ML
INJECTION, SOLUTION INTRAVENOUS CONTINUOUS
Status: DISCONTINUED | OUTPATIENT
Start: 2018-06-28 | End: 2018-06-28 | Stop reason: HOSPADM

## 2018-06-28 RX ORDER — SCOLOPAMINE TRANSDERMAL SYSTEM 1 MG/1
1 PATCH, EXTENDED RELEASE TRANSDERMAL
Status: DISCONTINUED | OUTPATIENT
Start: 2018-06-28 | End: 2018-06-28 | Stop reason: HOSPADM

## 2018-06-28 RX ADMIN — DEXTROSE AND SODIUM CHLORIDE: 5; 450 INJECTION, SOLUTION INTRAVENOUS at 09:27

## 2018-06-28 RX ADMIN — SODIUM CHLORIDE, POTASSIUM CHLORIDE, SODIUM LACTATE AND CALCIUM CHLORIDE: 600; 310; 30; 20 INJECTION, SOLUTION INTRAVENOUS at 08:41

## 2018-06-28 RX ADMIN — ALBUMIN HUMAN: 0.05 INJECTION, SOLUTION INTRAVENOUS at 08:14

## 2018-06-28 RX ADMIN — FENTANYL CITRATE 50 MCG: 50 INJECTION, SOLUTION INTRAMUSCULAR; INTRAVENOUS at 09:31

## 2018-06-28 RX ADMIN — FENTANYL CITRATE 50 MCG: 50 INJECTION, SOLUTION INTRAMUSCULAR; INTRAVENOUS at 09:39

## 2018-06-28 RX ADMIN — MIDAZOLAM 2 MG: 1 INJECTION INTRAMUSCULAR; INTRAVENOUS at 07:27

## 2018-06-28 RX ADMIN — ONDANSETRON 4 MG: 2 INJECTION INTRAMUSCULAR; INTRAVENOUS at 08:00

## 2018-06-28 RX ADMIN — ROCURONIUM BROMIDE 50 MG: 10 INJECTION INTRAVENOUS at 07:38

## 2018-06-28 RX ADMIN — SCOLOPAMINE TRANSDERMAL SYSTEM 1 PATCH: 1 PATCH, EXTENDED RELEASE TRANSDERMAL at 07:42

## 2018-06-28 RX ADMIN — FENTANYL CITRATE 50 MCG: 50 INJECTION, SOLUTION INTRAMUSCULAR; INTRAVENOUS at 08:07

## 2018-06-28 RX ADMIN — LIDOCAINE HYDROCHLORIDE 50 MG: 20 INJECTION, SOLUTION INFILTRATION; PERINEURAL at 07:38

## 2018-06-28 RX ADMIN — SODIUM CHLORIDE, POTASSIUM CHLORIDE, SODIUM LACTATE AND CALCIUM CHLORIDE: 600; 310; 30; 20 INJECTION, SOLUTION INTRAVENOUS at 07:27

## 2018-06-28 RX ADMIN — SODIUM CHLORIDE, SODIUM GLUCONATE, SODIUM ACETATE, POTASSIUM CHLORIDE AND MAGNESIUM CHLORIDE 500 ML: 526; 502; 368; 37; 30 INJECTION, SOLUTION INTRAVENOUS at 07:23

## 2018-06-28 RX ADMIN — PROPOFOL 200 MG: 10 INJECTION, EMULSION INTRAVENOUS at 07:38

## 2018-06-28 RX ADMIN — DEXAMETHASONE SODIUM PHOSPHATE 6 MG: 4 INJECTION, SOLUTION INTRA-ARTICULAR; INTRALESIONAL; INTRAMUSCULAR; INTRAVENOUS; SOFT TISSUE at 07:50

## 2018-06-28 NOTE — ANESTHESIA PREPROCEDURE EVALUATION
Anesthesia Evaluation     . Pt has had prior anesthetic. Type: General    History of anesthetic complications   - PONV        ROS/MED HX    ENT/Pulmonary:  - neg pulmonary ROS     Neurologic:     (+)other neuro migraine HA    Cardiovascular:  - neg cardiovascular ROS       METS/Exercise Tolerance:     Hematologic:  - neg hematologic  ROS       Musculoskeletal:  - neg musculoskeletal ROS       GI/Hepatic:     (+) GERD Other,       Renal/Genitourinary:  - ROS Renal section negative       Endo:  - neg endo ROS       Psychiatric:  - neg psychiatric ROS       Infectious Disease:  - neg infectious disease ROS       Malignancy:      - no malignancy   Other:                     Physical Exam      Airway   Mallampati: II  TM distance: >3 FB  Neck ROM: full    Dental     Cardiovascular   Rhythm and rate: regular      Pulmonary    breath sounds clear to auscultation                    Anesthesia Plan      History & Physical Review  History and physical reviewed and following examination; no interval change.    ASA Status:  2 .    NPO Status:  > 8 hours    Plan for General with Intravenous induction. Maintenance will be Inhalation.    PONV prophylaxis:  Ondansetron (or other 5HT-3), Scopolamine patch and Dexamethasone or Solumedrol       Postoperative Care  Postoperative pain management:  IV analgesics.      Consents  Anesthetic plan, risks, benefits and alternatives discussed with:  Patient..              Procedure: Procedure(s):  Bone Marrow [place]  - Wound Class: I-Clean    HPI: Xiao Rebollar is a 58 year old female scheduled for bone marrow harvest as a donor.  PMH relatively unremarkable - GERD managed with over the counter meds, migraine HA.  H/o PONV with anesthesia in the past, scopalamine patch ordered.  R/b/a for anesthesia discussed, pt agrees to proceed.    PMHx/PSHx:  No past medical history on file.    History reviewed. No pertinent surgical history.      No current facility-administered medications on  file prior to encounter.   Current Outpatient Prescriptions on File Prior to Encounter:  cyclobenzaprine (FLEXERIL) 10 MG tablet Take 10 mg by mouth as needed   ibuprofen (ADVIL/MOTRIN) 800 MG tablet Take 800 mg by mouth as needed   naproxen sodium (ANAPROX) 550 MG tablet Take 550 mg by mouth as needed   magnesium sulfate 500 mg/mL SOLN Take by mouth as needed   multivitamin, therapeutic with minerals (MULTI-VITAMIN) TABS tablet Take 1 tablet by mouth daily   SUMAtriptan (IMITREX) 100 MG tablet Take 100 mg by mouth as needed       Social Hx:   Social History   Substance Use Topics     Smoking status: Never Smoker     Smokeless tobacco: Never Used     Alcohol use Yes      Comment: Couple in 1 year        Allergies: No Known Allergies      NPO Status: Per ASA Guidelines    Labs:    Blood Bank:  Lab Results   Component Value Date    ABO A 06/27/2018    RH Pos 06/27/2018    AS Neg 06/27/2018     BMP:  Recent Labs   Lab Test  06/27/18   1118   NA  140   POTASSIUM  3.7   CHLORIDE  107   CO2  25   BUN  11   CR  0.68   GLC  107*   JACOBY  9.1     CBC:   Recent Labs   Lab Test  06/11/18   1209   WBC  9.6   RBC  5.04   HGB  15.3   HCT  45.5   MCV  90   MCH  30.4   MCHC  33.6   RDW  13.0   PLT  252     Coags:  Recent Labs   Lab Test  06/11/18   1209   INR  1.01   PTT  25     Lincoln Alberts MD  Staff Anesthesiologist  [phone]

## 2018-06-28 NOTE — IP AVS SNAPSHOT
"                  MRN:[id number]                      After Visit Summary   6/28/2018    Xiao Rebollar    MRN: [id number]           Thank you!     Thank you for choosing [facility] for your care. Our goal is always to provide you with excellent care. Hearing back from our patients is one way we can continue to improve our services. Please take a few minutes to complete the written survey that you may receive in the mail after you visit with us. Thank you!        Patient Information     Date Of Birth          1960        About your hospital stay     You were admitted on:  June 28, 2018 You last received care in the:  [address] [facility]    You were discharged on:  June 28, 2018       Who to Call     For medical emergencies, please call 911.  For non-urgent questions about your medical care, please call your primary care provider or clinic, [phone]  For questions related to your surgery, please call your surgery clinic        Attending Provider     Provider Specialty    Estefania Sim MD Hematology       Primary Care Provider Office Phone # Fax #    [facility] [phone]      Pending Results     No orders found from 6/26/2018 to 6/29/2018.            Admission Information     Date & Time Provider Department Dept. Phone    6/28/2018 Estefania Sim MD [address] [facility] [phone]      Your Vitals Were     Blood Pressure Temperature Respirations Height Weight Pulse Oximetry    115/74 (BP Location: Left arm) 97.3  F (36.3  C) (Oral) 16 1.575 m (5' 2\") 82.6 kg (182 lb 1.6 oz) 96%    BMI (Body Mass Index)                   33.31 kg/m2           [employer] Information     [employer] lets you send messages to your doctor, view your test results, renew your prescriptions, schedule appointments and more. To sign up, go to [email or web address] . Click on \"Log in\" on the left side of the screen, which will take you to the Welcome page. Then click on \"Sign up " "Now\" on the right side of the page.     You will be asked to enter the access code listed below, as well as some personal information. Please follow the directions to create your username and password.     Your access code is: [email or web address]  Expires: 2018  6:30 AM     Your access code will  in 90 days. If you need help or a new code, please call your [facility] clinic or [phone].        Care EveryWhere ID     This is your Care EveryWhere ID. This could be used by other organizations to access your [facility] medical records  [id number]        Equal Access to Services     [facility] : Haddina Lange, dallas arroyo, joya yu, linda stanley . So [facility] [phone].    ATENCIÓN: Si habla español, tiene a gregory disposición servicios gratuitos de asistencia lingüística. Ll[facility] [phone].    We comply with applicable federal civil rights laws and [place] laws. We do not discriminate on the basis of race, color, national origin, age, disability, sex, sexual orientation, or gender identity.               Review of your medicines      START taking        Dose / Directions    acetaminophen-codeine 300-30 MG per tablet   Commonly known as:  TYLENOL #3   Used for:  Bone marrow donor        Dose:  1-2 tablet   Take 1-2 tablets by mouth every 4 hours as needed for moderate pain   Quantity:  10 tablet   Refills:  0         CONTINUE these medicines which have NOT CHANGED        Dose / Directions    cyclobenzaprine 10 MG tablet   Commonly known as:  FLEXERIL        Dose:  10 mg   Take 10 mg by mouth as needed   Refills:  0       IMITREX 100 MG tablet   Generic drug:  SUMAtriptan        Dose:  100 mg   Take 100 mg by mouth as needed   Refills:  0       Multi-vitamin Tabs tablet        Dose:  1 tablet   Take 1 tablet by mouth daily   Refills:  0         STOP taking     ibuprofen 800 MG tablet   Commonly known as:  ADVIL/MOTRIN           magnesium sulfate 500 " mg/mL Soln           naproxen sodium 550 MG tablet   Commonly known as:  ANAPROX                Where to get your medicines      Some of these will need a paper prescription and others can be bought over the counter. Ask your nurse if you have questions.     Bring a paper prescription for each of these medications     acetaminophen-codeine 300-30 MG per tablet                Protect others around you: Learn how to safely use, store and throw away your medicines at www.disposemymeds.org.        Information about OPIOIDS     PRESCRIPTION OPIOIDS: WHAT YOU NEED TO KNOW   We gave you an opioid (narcotic) pain medicine. It is important to manage your pain, but opioids are not always the best choice. You should first try all the other options your care team gave you. Take this medicine for as short a time (and as few doses) as possible.     These medicines have risks:    DO NOT drive when on new or higher doses of pain medicine. These medicines can affect your alertness and reaction times, and you could be arrested for driving under the influence (DUI). If you need to use opioids long-term, talk to your care team about driving.    DO NOT operate heave machinery    DO NOT do any other dangerous activities while taking these medicines.     DO NOT drink any alcohol while taking these medicines.      If the opioid prescribed includes acetaminophen, DO NOT take with any other medicines that contain acetaminophen. Read all labels carefully. Look for the word  acetaminophen  or  Tylenol.  Ask your pharmacist if you have questions or are unsure.    You can get addicted to pain medicines, especially if you have a history of addiction (chemical, alcohol or substance dependence). Talk to your care team about ways to reduce this risk.    Store your pills in a secure place, locked if possible. We will not replace any lost or stolen medicine. If you don t finish your medicine, please throw away (dispose) as directed by your pharmacist.  The [place] Pollution Control Agency has more information about safe disposal: https://www.pca.[facility].mn.us/living-green/managing-unwanted-medications.     All opioids tend to cause constipation. Drink plenty of water and eat foods that have a lot of fiber, such as fruits, vegetables, prune juice, apple juice and high-fiber cereal. Take a laxative (Miralax, milk of magnesia, Colace, Senna) if you don t move your bowels at least every other day.              Medication List: This is a list of all your medications and when to take them. Check marks below indicate your daily home schedule. Keep this list as a reference.      Medications           Morning Afternoon Evening Bedtime As Needed    acetaminophen-codeine 300-30 MG per tablet   Commonly known as:  TYLENOL #3   Take 1-2 tablets by mouth every 4 hours as needed for moderate pain                                cyclobenzaprine 10 MG tablet   Commonly known as:  FLEXERIL   Take 10 mg by mouth as needed                                IMITREX 100 MG tablet   Take 100 mg by mouth as needed   Generic drug:  SUMAtriptan                                Multi-vitamin Tabs tablet   Take 1 tablet by mouth daily

## 2018-06-28 NOTE — PROCEDURES
Under general anesthesia, the patient was proned. The area over b/l iliac crests was prepped with betadine. Bone marrow harvest needles were introduced into both iliac crests, and 800 cc of bone marrow were harvested. The patient tolerated the procedure well without complications.    Estefania Sim

## 2018-06-28 NOTE — DISCHARGE SUMMARY
"[facility] Discharge Summary   Xiao Rebollar MRN# [id number]   Age: 58 year old  YOB: 1960   Date of Admission: 6/28/2018  Date of Discharge:    Admitting Physician: Estefania Sim MD  Discharge Physician:  Dr. Michelle Méndez  Discharge Diagnoses:    1.  Status post bone marrow harvest  2.  Stem cell donor  Discharge Medications:       Xiao Rebollar   Home Medication Instructions RADHA:[id number]    Printed on:06/28/18 4448   Medication Information                      acetaminophen-codeine (TYLENOL #3) 300-30 MG per tablet  Take 1-2 tablets by mouth every 4 hours as needed for moderate pain             cyclobenzaprine (FLEXERIL) 10 MG tablet  Take 10 mg by mouth as needed             multivitamin, therapeutic with minerals (MULTI-VITAMIN) TABS tablet  Take 1 tablet by mouth daily             SUMAtriptan (IMITREX) 100 MG tablet  Take 100 mg by mouth as needed             Hold naproxen, may increase bleeding.  Brief History of Illness:    **Adopted from H&P  Xiao is a healthy related donor of stem cells.  She will undergo a bone marrow harvest today.  Physical Exam:    /74 (BP Location: Left arm)  Temp 97.3  F (36.3  C) (Oral)  Resp 16  Ht 1.575 m (5' 2\")  Wt 82.6 kg (182 lb 1.6 oz)  SpO2 96%  BMI 33.31 kg/m2  General Appearance: well appearing, NAD.   HEENT: sclera anicteric, EOMI. Moist mucus membranes, no ulcerations or thrush.   CV: RRR, no murmur or rub.   RESP: CTA bilaterally; no rales or wheezes.  GI: +BS, soft, nontender, no masses or hepatosplenomegaly.  EXT: no edema kalina LE's  SKIN:  No rash or lesions on exposed areas.  NEURO: A&O x3; CN II-XII grossly intact.  PSYCH: Appropriate affect  VASCULAR ACCESS: Peripheral IV  Hospital Course:    Xiao Rebollar is a 58 year old women who is a related donor of bone marrow for son. She is status post bone marrow harvest today     BMT: Donor of 800 ml of bone marrow for son " "today.        HEMATOLOGY/COAGULATION: Looking to make sure hgb 8 or more before discharge.  hgb yesterday is about 15.See above        Pain:  Can  tylenol #3 from discharge pharmacy for pain as needed.  Do not drive or operate [place] on this medication.     POST BONE MARROW HARVEST DISCHARGE INSTRUCTIONS     Thank you again for your generous donation of life-saving bone marrow.  Along with the discharge instructions that the inpatient staff has or will provide to you, here are some additional suggestions/recommendations:     1. The large pressure dressing on your lower back harvest site(s) needs to remain in place for 24 hours post donation.  You may shower tomorrow, and then remove the large bandage.  Small steri-strips(which take the place of stitches) cover the actual incision. Leave these in place,they will fall off after approximately 1 week. You will probably notice some bruising around the area- this is normal and should resolve within a week or so.  Look for signs of infection( ie. redness, fever, drainage, pain or swelling) and report these immediately to any of the contacts below. It is best not to soak in a bathtub or whirlpool for 1 week.     2.  You have received intravenous fluids, along with your autologous blood units(if indicated) in the operating room this morning. It is normal for you to notice some slight puffiness in your hands, feet or face. Your body will eliminate this extra fluid naturally through urination.     3.  Some soreness and stiffness is to be expected after the procedure. Tylenol or extra strength tylenol should be adequate for pain relief.  We encourage you to take it easy for the next day or so.  Try and keep feet elevated if at all possible, and avoid strenuous activity for at least one week following your donation.     4. It is completely normal for you to feel very tired or \"wiped out\" for the first day or so after donation. Listen to your body and take frequent naps " if possible. Make sure to get adequate sleep at night, and you will soon feel completely restored.     5.  Should you have any questions or concerns, your contacts are:     -Bone Marrow Transplant Office: [phone] and they can direct you to your Nurse Coordinator.    -If after hours, you can contact the Blood and Marrow Transplant Physician on call by contacting the [facility]  at [phone] and asking for the BMT physician to call you.   - You can also call the Bone Marrow Transplant Clinic: [phone] and they can direct you to the right person.     OVERALL PLAN      Discharge today with grandson and .  Discharge instructions as above.     Peri Dorantes    CODE STATUS:   Discharge Instructions and Follow-Up:    Discharge diet: Regular diet as tolerated  Discharge activity: Activity as tolerated   Discharge follow-up: Follow up with BMT Clinic only with issues of bleeding, fevers, signs of infection:    Discharge Disposition:    Discharged to home.    Peri Dorantes PA-C  Pager: [phone]  Blood and Marrow Transplant  June 28, 2018

## 2018-06-28 NOTE — ANESTHESIA POSTPROCEDURE EVALUATION
Patient: Xiao Rebollar    Procedure(s):  Bone Marrow [place]  - Wound Class: I-Clean    Diagnosis:Donor   Diagnosis Additional Information: No value filed.    Anesthesia Type:  General    Note:  Anesthesia Post Evaluation    Patient location during evaluation: PACU  Patient participation: Able to fully participate in evaluation  Level of consciousness: awake and alert  Pain management: adequate  Airway patency: patent  Cardiovascular status: acceptable  Respiratory status: acceptable  Hydration status: acceptable  PONV: none             Last vitals:  Vitals:    06/28/18 0930 06/28/18 0942 06/28/18 0945   BP: 122/78 127/72 116/73   Resp: 12     Temp: 36.6  C (97.9  F) 36.6  C (97.9  F) 36.6  C (97.9  F)   SpO2: 100% 99% 90%         Electronically Signed By: Lincoln Alberts MD  June 28, 2018  9:58 AM

## 2018-06-28 NOTE — TELEPHONE ENCOUNTER
Assisted with harvest of bone marrow from bilateral posterior iliac crests.  EBL= 800cc with estimated nucleated cell count obtained (based on recipient weight of 85kg) of > or = 2.8 x 10^8 nc/kg after marrow filtered at back table.  Marrow product taken to Blood Bank at 0900 for export to Cell Processing lab prior to infusion into related recipient. Marrow product number:  [id number]

## 2018-06-28 NOTE — PROGRESS NOTES
"BMT Daily Progress Note   06/28/2018    Patient ID:  Xiao Rebollar is a 58 year old women who is a related donor of bone marrow for son. She is status post bone marrow harvest today     Diagnosis No data was found  HCT Type No data was found    Prep Regimen No data was found   Donor Source No data was found    GVHD Prophylaxis No data was found  Primary BMT Provider none     Xiao was admitted on 6/28/2018 for bilateral bone marrow harvest    INTERVAL  HISTORY     Doing well.  She is now on room air.  She denies any current pain after harvest.  She had about 800 ml of bone marrow removed.  She denies cough.  She has urinated.  No rash.  She is eating and drinking.  She is afebrile. She has urinated.  Denies nausea.    Review of Systems: 10 point ROS negative except as noted above.    Scheduled Medications    scopolamine  1 patch Transdermal Q72H    And     scopolamine   Transdermal Q8H    And     [START ON 7/1/2018] scopolamine   Transdermal Q72H     Current Facility-Administered Medications   Medication     acetaminophen-codeine (TYLENOL #3) 300-30 MG per tablet 1-2 tablet     dextrose 5% and 0.45% NaCl infusion     naloxone (NARCAN) injection 0.1-0.4 mg     scopolamine (TRANSDERM) 72 hr patch 1 patch    And     scopolamine (TRANSDERM-SCOP) Patch in Place    And     [START ON 7/1/2018] scopolamine (TRANSDERM-SCOP) patch REMOVAL       PHYSICAL EXAM     Weight In/Out     Wt Readings from Last 3 Encounters:   06/28/18 82.6 kg (182 lb 1.6 oz)   06/27/18 81.9 kg (180 lb 8 oz)   06/11/18 82.3 kg (181 lb 7 oz)      I/O last 3 completed shifts:  In: 2000 [I.V.:1500]  Out: 2050 [Urine:1250; Blood:800]           /74 (BP Location: Left arm)  Temp 97.3  F (36.3  C) (Oral)  Resp 16  Ht 1.575 m (5' 2\")  Wt 82.6 kg (182 lb 1.6 oz)  SpO2 96%  BMI 33.31 kg/m2       General: NAD   Eyes: : ROSALIND, sclera anicteric   Nose/Mouth/Throat: OP clear, buccal mucosa moist, no ulcerations   Lungs: CTA " bilaterally  Cardiovascular: RRR, no M/R/G   Abdominal/Rectal: +BS, soft, NT, ND, No HSM   Lymphatics: no edema  Skin: no rashes or petechaie  Neuro: A&O   Additional Findings:  PIV      LABS AND IMAGING - PAST 24 HOURS     Results for orders placed or performed during the hospital encounter of 06/28/18 (from the past 24 hour(s))   Glucose by meter   Result Value Ref Range    Glucose 103 (H) 70 - 99 mg/dL   WBC Bone marrow   Result Value Ref Range    WBC Bone Marrow 28.1 10e9/L   WBC Bone marrow   Result Value Ref Range    WBC Bone Marrow 17.9 10e9/L     Lab Results   Component Value Date    WBC 9.6 06/11/2018     Lab Results   Component Value Date    RBC 5.04 06/11/2018     Lab Results   Component Value Date    HGB 15.3 06/11/2018     Lab Results   Component Value Date    HCT 45.5 06/11/2018     No components found for: MCT  Lab Results   Component Value Date    MCV 90 06/11/2018     Lab Results   Component Value Date    MCH 30.4 06/11/2018     Lab Results   Component Value Date    MCHC 33.6 06/11/2018     Lab Results   Component Value Date    RDW 13.0 06/11/2018     Lab Results   Component Value Date     06/11/2018         ASSESSMENT BY SYSTEMS     Xiao Rebollar is a 58 year old women who is a related donor of bone marrow for son. She is status post bone marrow harvest today    BMT: Donor of 800 ml of bone marrow for son today.      HEMATOLOGY/COAGULATION: Looking to make sure hgb 8 or more.  hgb yesterday is about 15.      Pain:  Can  tylenol #3 from discharge pharmacy for pain as needed.  Do not drive or operate [place] on this medication.    POST BONE MARROW HARVEST DISCHARGE INSTRUCTIONS    Thank you again for your generous donation of life-saving bone marrow.  Along with the discharge instructions that the inpatient staff has or will provide to you, here are some additional suggestions/recommendations:    1. The large pressure dressing on your lower back harvest site(s) needs to remain in  "place for 24 hours post donation.  You may shower tomorrow, and then remove the large bandage.  Small steri-strips(which take the place of stitches) cover the actual incision. Leave these in place,they will fall off after approximately 1 week. You will probably notice some bruising around the area- this is normal and should resolve within a week or so.  Look for signs of infection( ie. redness, fever, drainage, pain or swelling) and report these immediately to any of the contacts below. It is best not to soak in a bathtub or whirlpool for 1 week.    2.  You have received intravenous fluids, along with your autologous blood units(if indicated) in the operating room this morning. It is normal for you to notice some slight puffiness in your hands, feet or face. Your body will eliminate this extra fluid naturally through urination.    3.  Some soreness and stiffness is to be expected after the procedure. Tylenol or extra strength tylenol should be adequate for pain relief.  We encourage you to take it easy for the next day or so.  Try and keep feet elevated if at all possible, and avoid strenuous activity for at least one week following your donation.    4. It is completely normal for you to feel very tired or \"wiped out\" for the first day or so after donation. Listen to your body and take frequent naps if possible. Make sure to get adequate sleep at night, and you will soon feel completely restored.    5.  Should you have any questions or concerns, your contacts are:    -Bone Marrow Transplant Office: [phone] and they can direct you to your Nurse Coordinator.    -If after hours, you can contact the Blood and Marrow Transplant Physician on call by contacting the [facility]  at [phone] and asking for the BMT physician to call you.   - You can also call the Bone Marrow Transplant Clinic: [phone] and they can direct you to the right person.    OVERALL PLAN     Discharge today with " grandson and .  Discharge instructions as above.    Peri Dorantes

## 2018-06-28 NOTE — ANESTHESIA CARE TRANSFER NOTE
Patient: Xiao Rebollar    Procedure(s):  Bone Marrow [place]  - Wound Class: I-Clean    Diagnosis: Donor   Diagnosis Additional Information: No value filed.    Anesthesia Type:   General     Note:  Airway :Nasal Cannula  Patient transferred to:PACU  Comments: Awake with good patent airway.  VSSHandoff Report: Identifed the Patient, Identified the Reponsible Provider, Reviewed the pertinent medical history, Discussed the surgical course, Reviewed Intra-OP anesthesia mangement and issues during anesthesia, Set expectations for post-procedure period and Allowed opportunity for questions and acknowledgement of understanding      Vitals: (Last set prior to Anesthesia Care Transfer)    CRNA VITALS  6/28/2018 0833 - 6/28/2018 0908      6/28/2018             Resp Rate (observed): (!)  2                Electronically Signed By: JEREL Leroy CRNA  June 28, 2018  9:08 AM

## 2018-06-28 NOTE — INTERIM SUMMARY
"BMT Summary of Care        June 28, 2018 3:09 PM  Xiao Rebollar  MRN: [id number]    Discharge Date: 6/28/2018    BMT Primary Physician: Dr. Sim    BMT Nurse Coordinator: Awais Dorsey    Discharge Diagnosis:   Stem cell donor  S/P bone marrow [place]    Discharge To: Home    Activity: As tolerated  POST BONE MARROW HARVEST DISCHARGE INSTRUCTIONS    Thank you again for your generous donation of life-saving bone marrow.  Along with the discharge instructions that the inpatient staff has or will provide to you, here are some additional suggestions/recommendations:    1. The large pressure dressing on your lower back harvest site(s) needs to remain in place for 24 hours post donation.  You may shower tomorrow, and then remove the large bandage.  Small steri-strips(which take the place of stitches) cover the actual incision. Leave these in place,they will fall off after approximately 1 week. You will probably notice some bruising around the area- this is normal and should resolve within a week or so.  Look for signs of infection( ie. redness, fever, drainage, pain or swelling) and report these immediately to any of the contacts below. It is best not to soak in a bathtub or whirlpool for 1 week.    2.  You have received intravenous fluids, along with your autologous blood units(if indicated) in the operating room this morning. It is normal for you to notice some slight puffiness in your hands, feet or face. Your body will eliminate this extra fluid naturally through urination.    3.  Some soreness and stiffness is to be expected after the procedure. Tylenol or extra strength tylenol should be adequate for pain relief.  We encourage you to take it easy for the next day or so.  Try and keep feet elevated if at all possible, and avoid strenuous activity for at least one week following your donation.    4. It is completely normal for you to feel very tired or \"wiped out\" for the first day or so after donation. Listen to " your body and take frequent naps if possible. Make sure to get adequate sleep at night, and you will soon feel completely restored.    5.  Should you have any questions or concerns, your contacts are:    -Bone Marrow Transplant Office: [phone] and they can direct you to your Nurse Coordinator.    -If after hours, you can contact the Blood and Marrow Transplant Physician on call by contacting the [facility]  at [phone] and asking for the BMT physician to call you.   - You can also call the [facility]: [phone] and they can direct you to the right person.       Esevince Xiao   Home Medication Instructions RADHA:[id number]    Printed on:06/28/18 4566   Medication Information                      acetaminophen-codeine (TYLENOL #3) 300-30 MG per tablet  Take 1-2 tablets by mouth every 4 hours as needed for moderate pain             cyclobenzaprine (FLEXERIL) 10 MG tablet  Take 10 mg by mouth as needed             multivitamin, therapeutic with minerals (MULTI-VITAMIN) TABS tablet  Take 1 tablet by mouth daily             naproxen sodium (ANAPROX) 550 MG tablet  Take 550 mg by mouth as needed             SUMAtriptan (IMITREX) 100 MG tablet  Take 100 mg by mouth as needed               Please hold off on naproxen sodium (ANAPROX) 550 MG tablet as can make platelets not function as well.  Platelets help prevent bleeding.

## 2018-06-28 NOTE — PROGRESS NOTES
Shift: 1000 - 1500  DONOR   VS: Temp: 97.7  F (36.5  C) Temp src: Oral BP: 110/80   Heart Rate: 82 Resp: 16 SpO2: 98 % O2 Device: None (Room air) Oxygen Delivery: 2 LPM  Pain: Reports pain as tolerable, declines pain med.   Neuro: VSS. A&Ox4, pleasant and cooperative   Cardiac:   WDL  Respiratory: Lung sounds clear. RA  GI/Diet/Appetite: Tolerating a regular diet with good appetite. Bowel sounds present. Scopolomine patch for nausea.   :  Voiding w/o difficulty  LDA's: PIV SL  Skin: incisions at bilateral Iliac crests, incisions approximated, scant amt of dried drainage.   Activity: Independent.   Tests/Procedures: Bone marrow harvest this morning for her son, patient arrived on unit at approximately 1000, A&Ox4. Currently weaned off IV fluids and oxygen. 800cc of blood removed during procedure, 500cc albumin given with 1500ml of LR.   Pertinent Labs/Lab Collection:      Plan: Continue to monitor, Hgb re-check at 1500 and DC when goals met.

## 2018-06-28 NOTE — IP AVS SNAPSHOT
[address] BMT [address] [address]    Phone:  [phone]    Fax:  [phone]                                       After Visit Summary   6/28/2018    Xiao Rebollar    MRN: [id number]           After Visit Summary Signature Page     I have received my discharge instructions, and my questions have been answered. I have discussed any challenges I see with this plan with the nurse or doctor.    ..........................................................................................................................................  Patient/Patient Representative Signature      ..........................................................................................................................................  Patient Representative Print Name and Relationship to Patient    ..................................................               ................................................  Date                                            Time    ..........................................................................................................................................  Reviewed by Signature/Title    ...................................................              ..............................................  Date                                                            Time

## 2018-06-29 ENCOUNTER — DOCUMENTATION ONLY (OUTPATIENT)
Dept: TRANSPLANT | Facility: CLINIC | Age: 58
End: 2018-06-29

## 2018-06-29 NOTE — TELEPHONE ENCOUNTER
POST DISCHARGEPOST HOSPITAL DISCHARGE FOLLOW-UP PHONE CALL    Follow-Up Type: Hospital Discharge - Follow-Up Call  Date of Admission: 6/28/18  Date of Discharge: 6/28/18  Discharge Diagnosis: healthy related bone marrow donor  Next BMT Follow-up Visit: TAISHA  Discharging Provider: Peri Dorantes  Primary BMT Physician: TAISHA    Summary of Encounter:  Contacted patient via phone to conduct follow-up assessment post recent hospital discharge. Patient verbalized that they have received and understand written post discharge care instructions, have  a current medication list as well as contact phone numbers.  Patient understands to call BMT office @ [phone] during office hours Mon-Fri 8am-4:30pm, and [phone] after hours to report symptoms.  Patient verbalized that they have all necessary medications, have no questions regarding their administration instructions and is currently taking them without difficulty.    Patient does not report any new symptoms or concerns and has no further questions at this time.

## (undated) DEVICE — PREP SKIN SCRUB TRAY 4461A

## (undated) DEVICE — TUBE BLOOD VACUETTE RED TOP 4ML 454204

## (undated) DEVICE — DRAPE MAYO STAND 23X54 8337

## (undated) DEVICE — DRAPE LAP W/ARMBOARD 29410

## (undated) DEVICE — SYR 50ML LL W/O NDL 309653

## (undated) DEVICE — LINEN TOWEL PACK X6 WHITE 5487

## (undated) DEVICE — SPONGE RAY-TEC 4X8" 7318

## (undated) DEVICE — PACK GOWN 3/PK DISP XL SBA32GPFCB

## (undated) DEVICE — DRAPE IOBAN INCISE 23X17" 6650EZ

## (undated) DEVICE — BLADE KNIFE SURG 11 371111

## (undated) DEVICE — BASIN EMESIS STERILE  SSK9005A

## (undated) DEVICE — PAD CHUX UNDERPAD 23X24" 7136

## (undated) DEVICE — TAPE MICROPORE 2"X1.5YD 1530S-2

## (undated) DEVICE — NDL COUNTER 20CT 31142493

## (undated) DEVICE — SYR 30ML LL W/O NDL 302832

## (undated) DEVICE — NDL 21GA 1.5"

## (undated) DEVICE — LINEN TOWEL PACK X5 5464

## (undated) DEVICE — DECANTER BAG 2002S

## (undated) DEVICE — DRAPE LAP TRANSVERSE 29421

## (undated) DEVICE — SYR 03ML LL W/O NDL 309657

## (undated) DEVICE — TAPE MEDIPORE 2"X2YD 2962S

## (undated) DEVICE — BONE MARROW COLLECTION KIT W/1.2L BAG

## (undated) DEVICE — SOL NACL 0.9% IRRIG 1000ML BOTTLE 2F7124

## (undated) DEVICE — CONNECTOR STOPCOCK 3 WAY MALE LL HI-FLO MX9311L

## (undated) DEVICE — SOL ADH LIQUID BENZOIN SWAB 0.6ML C1544

## (undated) DEVICE — PITCHER STERILE 1000ML  SSK9004A

## (undated) DEVICE — DRAPE IOBAN INCISE 13X13" 6640EZ

## (undated) DEVICE — PACK SET-UP STD 9102

## (undated) DEVICE — DRSG STERI STRIP 1/2X4" R1547

## (undated) DEVICE — LABEL MEDICATION SYSTEM 3303-P

## (undated) DEVICE — DRSG GAUZE 4X4" TRAY 6939

## (undated) DEVICE — DRAPE ISOLATION BAG 1003

## (undated) DEVICE — PREP POVIDONE IODINE SCRUB 7.5% 120ML

## (undated) DEVICE — NDL BX BONE MARROW 11GA 4"

## (undated) DEVICE — PREP POVIDONE IODINE SOLUTION 10% 120ML

## (undated) RX ORDER — FENTANYL CITRATE 50 UG/ML
INJECTION, SOLUTION INTRAMUSCULAR; INTRAVENOUS
Status: DISPENSED
Start: 2018-06-28

## (undated) RX ORDER — PROPOFOL 10 MG/ML
INJECTION, EMULSION INTRAVENOUS
Status: DISPENSED
Start: 2018-06-28

## (undated) RX ORDER — ALBUMIN, HUMAN INJ 5% 5 %
SOLUTION INTRAVENOUS
Status: DISPENSED
Start: 2018-06-28

## (undated) RX ORDER — ONDANSETRON 2 MG/ML
INJECTION INTRAMUSCULAR; INTRAVENOUS
Status: DISPENSED
Start: 2018-06-28

## (undated) RX ORDER — DEXAMETHASONE SODIUM PHOSPHATE 4 MG/ML
INJECTION, SOLUTION INTRA-ARTICULAR; INTRALESIONAL; INTRAMUSCULAR; INTRAVENOUS; SOFT TISSUE
Status: DISPENSED
Start: 2018-06-28